# Patient Record
Sex: MALE | Race: BLACK OR AFRICAN AMERICAN | NOT HISPANIC OR LATINO | Employment: UNEMPLOYED | URBAN - METROPOLITAN AREA
[De-identification: names, ages, dates, MRNs, and addresses within clinical notes are randomized per-mention and may not be internally consistent; named-entity substitution may affect disease eponyms.]

---

## 2017-11-27 ENCOUNTER — HISTORICAL (OUTPATIENT)
Dept: INTERNAL MEDICINE | Facility: CLINIC | Age: 57
End: 2017-11-27

## 2017-11-27 LAB
ABS NEUT (OLG): 4.36 X10(3)/MCL (ref 2.1–9.2)
ALBUMIN SERPL-MCNC: 3.7 GM/DL (ref 3.4–5)
ALBUMIN/GLOB SERPL: 1 RATIO (ref 1–2)
ALP SERPL-CCNC: 94 UNIT/L (ref 45–117)
ALT SERPL-CCNC: 31 UNIT/L (ref 12–78)
AST SERPL-CCNC: 11 UNIT/L (ref 15–37)
BASOPHILS # BLD AUTO: 0.05 X10(3)/MCL
BASOPHILS NFR BLD AUTO: 1 % (ref 0–1)
BILIRUB SERPL-MCNC: 0.4 MG/DL (ref 0.2–1)
BILIRUBIN DIRECT+TOT PNL SERPL-MCNC: 0.1 MG/DL
BILIRUBIN DIRECT+TOT PNL SERPL-MCNC: 0.3 MG/DL
BUN SERPL-MCNC: 17 MG/DL (ref 7–18)
CALCIUM SERPL-MCNC: 9 MG/DL (ref 8.5–10.1)
CHLORIDE SERPL-SCNC: 104 MMOL/L (ref 98–107)
CHOLEST SERPL-MCNC: 190 MG/DL
CHOLEST/HDLC SERPL: 3.5 {RATIO} (ref 0–5)
CO2 SERPL-SCNC: 28 MMOL/L (ref 21–32)
CREAT SERPL-MCNC: 0.7 MG/DL (ref 0.6–1.3)
EOSINOPHIL # BLD AUTO: 0.13 X10(3)/MCL
EOSINOPHIL NFR BLD AUTO: 2 % (ref 0–5)
ERYTHROCYTE [DISTWIDTH] IN BLOOD BY AUTOMATED COUNT: 15.3 % (ref 11.5–14.5)
EST. AVERAGE GLUCOSE BLD GHB EST-MCNC: 123 MG/DL
GLOBULIN SER-MCNC: 3.6 GM/ML (ref 2.3–3.5)
GLUCOSE SERPL-MCNC: 90 MG/DL (ref 74–106)
HBA1C MFR BLD: 5.9 % (ref 4.2–6.3)
HCT VFR BLD AUTO: 42.9 % (ref 40–51)
HDLC SERPL-MCNC: 55 MG/DL
HGB BLD-MCNC: 14.5 GM/DL (ref 13.5–17.5)
IMM GRANULOCYTES # BLD AUTO: 0.03 10*3/UL
IMM GRANULOCYTES NFR BLD AUTO: 0 %
LDLC SERPL CALC-MCNC: 101 MG/DL (ref 0–130)
LYMPHOCYTES # BLD AUTO: 2.26 X10(3)/MCL
LYMPHOCYTES NFR BLD AUTO: 30 % (ref 15–40)
MCH RBC QN AUTO: 29.1 PG (ref 26–34)
MCHC RBC AUTO-ENTMCNC: 33.8 GM/DL (ref 31–37)
MCV RBC AUTO: 86 FL (ref 80–100)
MONOCYTES # BLD AUTO: 0.74 X10(3)/MCL
MONOCYTES NFR BLD AUTO: 10 % (ref 4–12)
NEUTROPHILS # BLD AUTO: 4.36 X10(3)/MCL
NEUTROPHILS NFR BLD AUTO: 58 X10(3)/MCL
PLATELET # BLD AUTO: 362 X10(3)/MCL (ref 130–400)
PMV BLD AUTO: 8.2 FL (ref 7.4–10.4)
POTASSIUM SERPL-SCNC: 3.8 MMOL/L (ref 3.5–5.1)
PROT SERPL-MCNC: 7.3 GM/DL (ref 6.4–8.2)
PSA SERPL-MCNC: 0.5 NG/ML
RBC # BLD AUTO: 4.99 X10(6)/MCL (ref 4.5–5.9)
SODIUM SERPL-SCNC: 141 MMOL/L (ref 136–145)
TRIGL SERPL-MCNC: 168 MG/DL
TSH SERPL-ACNC: 1.03 MIU/L (ref 0.36–3.74)
VLDLC SERPL CALC-MCNC: 34 MG/DL
WBC # SPEC AUTO: 7.6 X10(3)/MCL (ref 4.5–11)

## 2018-03-16 ENCOUNTER — HISTORICAL (OUTPATIENT)
Dept: ENDOSCOPY | Facility: HOSPITAL | Age: 58
End: 2018-03-16

## 2019-02-04 ENCOUNTER — HISTORICAL (OUTPATIENT)
Dept: ADMINISTRATIVE | Facility: HOSPITAL | Age: 59
End: 2019-02-04

## 2019-02-04 LAB
ABS NEUT (OLG): 3.99 X10(3)/MCL (ref 2.1–9.2)
ALBUMIN SERPL-MCNC: 3.9 GM/DL (ref 3.4–5)
ALBUMIN/GLOB SERPL: 1.2 RATIO (ref 1.1–2)
ALP SERPL-CCNC: 118 UNIT/L (ref 45–117)
ALT SERPL-CCNC: 36 UNIT/L (ref 12–78)
AST SERPL-CCNC: 14 UNIT/L (ref 15–37)
BASOPHILS # BLD AUTO: 0.03 X10(3)/MCL
BASOPHILS NFR BLD AUTO: 0 %
BILIRUB SERPL-MCNC: 0.2 MG/DL (ref 0.2–1)
BILIRUBIN DIRECT+TOT PNL SERPL-MCNC: <0.1 MG/DL
BILIRUBIN DIRECT+TOT PNL SERPL-MCNC: ABNORMAL MG/DL
BUN SERPL-MCNC: 18 MG/DL (ref 7–18)
CALCIUM SERPL-MCNC: 8.4 MG/DL (ref 8.5–10.1)
CHLORIDE SERPL-SCNC: 106 MMOL/L (ref 98–107)
CHOLEST SERPL-MCNC: 179 MG/DL
CHOLEST/HDLC SERPL: 3.4 {RATIO} (ref 0–5)
CO2 SERPL-SCNC: 28 MMOL/L (ref 21–32)
CREAT SERPL-MCNC: 0.9 MG/DL (ref 0.6–1.3)
EOSINOPHIL # BLD AUTO: 0.12 10*3/UL
EOSINOPHIL NFR BLD AUTO: 2 %
ERYTHROCYTE [DISTWIDTH] IN BLOOD BY AUTOMATED COUNT: 15.3 % (ref 11.5–14.5)
EST. AVERAGE GLUCOSE BLD GHB EST-MCNC: 137 MG/DL
GLOBULIN SER-MCNC: 3.3 GM/ML (ref 2.3–3.5)
GLUCOSE SERPL-MCNC: 104 MG/DL (ref 74–106)
HBA1C MFR BLD: 6.4 % (ref 4.2–6.3)
HCT VFR BLD AUTO: 40.7 % (ref 40–51)
HDLC SERPL-MCNC: 53 MG/DL
HGB BLD-MCNC: 13.5 GM/DL (ref 13.5–17.5)
IMM GRANULOCYTES # BLD AUTO: 0.01 10*3/UL
IMM GRANULOCYTES NFR BLD AUTO: 0 %
LDLC SERPL CALC-MCNC: 84 MG/DL (ref 0–130)
LYMPHOCYTES # BLD AUTO: 2.02 X10(3)/MCL
LYMPHOCYTES NFR BLD AUTO: 30 % (ref 13–40)
MCH RBC QN AUTO: 28.8 PG (ref 26–34)
MCHC RBC AUTO-ENTMCNC: 33.2 GM/DL (ref 31–37)
MCV RBC AUTO: 86.8 FL (ref 80–100)
MONOCYTES # BLD AUTO: 0.63 X10(3)/MCL
MONOCYTES NFR BLD AUTO: 9 % (ref 4–12)
NEUTROPHILS # BLD AUTO: 3.99 X10(3)/MCL
NEUTROPHILS NFR BLD AUTO: 59 X10(3)/MCL
PLATELET # BLD AUTO: 356 X10(3)/MCL (ref 130–400)
PMV BLD AUTO: 8 FL (ref 7.4–10.4)
POTASSIUM SERPL-SCNC: 3.5 MMOL/L (ref 3.5–5.1)
PROT SERPL-MCNC: 7.2 GM/DL (ref 6.4–8.2)
RBC # BLD AUTO: 4.69 X10(6)/MCL (ref 4.5–5.9)
SODIUM SERPL-SCNC: 140 MMOL/L (ref 136–145)
TRIGL SERPL-MCNC: 209 MG/DL
TSH SERPL-ACNC: 1.43 MIU/L (ref 0.36–3.74)
VLDLC SERPL CALC-MCNC: 42 MG/DL
WBC # SPEC AUTO: 6.8 X10(3)/MCL (ref 4.5–11)

## 2020-02-14 ENCOUNTER — HISTORICAL (OUTPATIENT)
Dept: ADMINISTRATIVE | Facility: HOSPITAL | Age: 60
End: 2020-02-14

## 2020-02-14 LAB
ABS NEUT (OLG): 3.26 X10(3)/MCL (ref 2.1–9.2)
ALBUMIN SERPL-MCNC: 3.5 GM/DL (ref 3.4–5)
ALBUMIN/GLOB SERPL: 1.1 RATIO (ref 1.1–2)
ALP SERPL-CCNC: 129 UNIT/L (ref 45–117)
ALT SERPL-CCNC: 31 UNIT/L (ref 12–78)
AST SERPL-CCNC: 12 UNIT/L (ref 15–37)
BASOPHILS # BLD AUTO: 0 X10(3)/MCL (ref 0–0.2)
BASOPHILS NFR BLD AUTO: 1 %
BILIRUB SERPL-MCNC: <0.1 MG/DL (ref 0.2–1)
BILIRUBIN DIRECT+TOT PNL SERPL-MCNC: <0.1 MG/DL (ref 0–0.2)
BILIRUBIN DIRECT+TOT PNL SERPL-MCNC: ABNORMAL MG/DL
BUN SERPL-MCNC: 13 MG/DL (ref 7–18)
CALCIUM SERPL-MCNC: 8.6 MG/DL (ref 8.5–10.1)
CHLORIDE SERPL-SCNC: 110 MMOL/L (ref 98–107)
CHOLEST SERPL-MCNC: 196 MG/DL
CHOLEST/HDLC SERPL: 3.8 {RATIO} (ref 0–5)
CO2 SERPL-SCNC: 27 MMOL/L (ref 21–32)
CREAT SERPL-MCNC: 0.7 MG/DL (ref 0.6–1.3)
EOSINOPHIL # BLD AUTO: 0.2 X10(3)/MCL (ref 0–0.9)
EOSINOPHIL NFR BLD AUTO: 3 %
ERYTHROCYTE [DISTWIDTH] IN BLOOD BY AUTOMATED COUNT: 15 % (ref 11.5–14.5)
ERYTHROCYTE [SEDIMENTATION RATE] IN BLOOD: 2 MM/HR (ref 0–15)
EST. AVERAGE GLUCOSE BLD GHB EST-MCNC: 131 MG/DL
GLOBULIN SER-MCNC: 3.3 GM/ML (ref 2.3–3.5)
GLUCOSE SERPL-MCNC: 97 MG/DL (ref 74–106)
HBA1C MFR BLD: 6.2 % (ref 4.2–6.3)
HCT VFR BLD AUTO: 43.3 % (ref 40–51)
HDLC SERPL-MCNC: 51 MG/DL (ref 40–59)
HGB BLD-MCNC: 14.2 GM/DL (ref 13.5–17.5)
IMM GRANULOCYTES # BLD AUTO: 0.01 10*3/UL
IMM GRANULOCYTES NFR BLD AUTO: 0 %
LDLC SERPL CALC-MCNC: 127 MG/DL
LYMPHOCYTES # BLD AUTO: 2.4 X10(3)/MCL (ref 0.6–4.6)
LYMPHOCYTES NFR BLD AUTO: 37 %
MCH RBC QN AUTO: 29.6 PG (ref 26–34)
MCHC RBC AUTO-ENTMCNC: 32.8 GM/DL (ref 31–37)
MCV RBC AUTO: 90.2 FL (ref 80–100)
MONOCYTES # BLD AUTO: 0.6 X10(3)/MCL (ref 0.1–1.3)
MONOCYTES NFR BLD AUTO: 9 %
NEUTROPHILS # BLD AUTO: 3.26 X10(3)/MCL (ref 2.1–9.2)
NEUTROPHILS NFR BLD AUTO: 50 %
PLATELET # BLD AUTO: 312 X10(3)/MCL (ref 130–400)
PMV BLD AUTO: 8 FL (ref 7.4–10.4)
POTASSIUM SERPL-SCNC: 4 MMOL/L (ref 3.5–5.1)
PROT SERPL-MCNC: 6.8 GM/DL (ref 6.4–8.2)
RBC # BLD AUTO: 4.8 X10(6)/MCL (ref 4.5–5.9)
SODIUM SERPL-SCNC: 140 MMOL/L (ref 136–145)
TRIGL SERPL-MCNC: 90 MG/DL
TSH SERPL-ACNC: 2.63 MIU/L (ref 0.36–3.74)
VLDLC SERPL CALC-MCNC: 18 MG/DL
WBC # SPEC AUTO: 6.5 X10(3)/MCL (ref 4.5–11)

## 2020-06-24 ENCOUNTER — HISTORICAL (OUTPATIENT)
Dept: CARDIOLOGY | Facility: HOSPITAL | Age: 60
End: 2020-06-24

## 2021-02-12 ENCOUNTER — HISTORICAL (OUTPATIENT)
Dept: ADMINISTRATIVE | Facility: HOSPITAL | Age: 61
End: 2021-02-12

## 2021-02-12 LAB
ABS NEUT (OLG): 2.91 X10(3)/MCL (ref 2.1–9.2)
ALBUMIN SERPL-MCNC: 4.1 GM/DL (ref 3.4–4.8)
ALBUMIN/GLOB SERPL: 1.4 RATIO (ref 1.1–2)
ALP SERPL-CCNC: 102 UNIT/L (ref 40–150)
ALT SERPL-CCNC: 33 UNIT/L (ref 0–55)
AST SERPL-CCNC: 16 UNIT/L (ref 5–34)
BASOPHILS # BLD AUTO: 0 X10(3)/MCL (ref 0–0.2)
BASOPHILS NFR BLD AUTO: 1 %
BILIRUB SERPL-MCNC: 0.2 MG/DL
BILIRUBIN DIRECT+TOT PNL SERPL-MCNC: 0.1 MG/DL (ref 0–0.5)
BILIRUBIN DIRECT+TOT PNL SERPL-MCNC: 0.1 MG/DL (ref 0–0.8)
BUN SERPL-MCNC: 14 MG/DL (ref 8.4–25.7)
CALCIUM SERPL-MCNC: 9 MG/DL (ref 8.8–10)
CHLORIDE SERPL-SCNC: 104 MMOL/L (ref 98–107)
CHOLEST SERPL-MCNC: 187 MG/DL
CHOLEST/HDLC SERPL: 4 {RATIO} (ref 0–5)
CO2 SERPL-SCNC: 26 MMOL/L (ref 23–31)
CREAT SERPL-MCNC: 0.75 MG/DL (ref 0.73–1.18)
EOSINOPHIL # BLD AUTO: 0.2 X10(3)/MCL (ref 0–0.9)
EOSINOPHIL NFR BLD AUTO: 3 %
ERYTHROCYTE [DISTWIDTH] IN BLOOD BY AUTOMATED COUNT: 15 % (ref 11.5–14.5)
EST. AVERAGE GLUCOSE BLD GHB EST-MCNC: 119.8 MG/DL
GLOBULIN SER-MCNC: 3 GM/DL (ref 2.4–3.5)
GLUCOSE SERPL-MCNC: 88 MG/DL (ref 82–115)
HBA1C MFR BLD: 5.8 %
HCT VFR BLD AUTO: 43.6 % (ref 40–51)
HDLC SERPL-MCNC: 49 MG/DL (ref 35–60)
HGB BLD-MCNC: 14.2 GM/DL (ref 13.5–17.5)
IMM GRANULOCYTES # BLD AUTO: 0.02 10*3/UL
IMM GRANULOCYTES NFR BLD AUTO: 0 %
LDLC SERPL CALC-MCNC: 110 MG/DL (ref 50–140)
LYMPHOCYTES # BLD AUTO: 2.7 X10(3)/MCL (ref 0.6–4.6)
LYMPHOCYTES NFR BLD AUTO: 41 %
MCH RBC QN AUTO: 28.9 PG (ref 26–34)
MCHC RBC AUTO-ENTMCNC: 32.6 GM/DL (ref 31–37)
MCV RBC AUTO: 88.6 FL (ref 80–100)
MONOCYTES # BLD AUTO: 0.6 X10(3)/MCL (ref 0.1–1.3)
MONOCYTES NFR BLD AUTO: 9 %
NEUTROPHILS # BLD AUTO: 2.91 X10(3)/MCL (ref 2.1–9.2)
NEUTROPHILS NFR BLD AUTO: 45 %
PLATELET # BLD AUTO: 384 X10(3)/MCL (ref 130–400)
PMV BLD AUTO: 8.3 FL (ref 7.4–10.4)
POTASSIUM SERPL-SCNC: 3.6 MMOL/L (ref 3.5–5.1)
PROT SERPL-MCNC: 7.1 GM/DL (ref 5.8–7.6)
PSA SERPL-MCNC: 0.54 NG/ML
RBC # BLD AUTO: 4.92 X10(6)/MCL (ref 4.5–5.9)
SODIUM SERPL-SCNC: 139 MMOL/L (ref 136–145)
T4 FREE SERPL-MCNC: 0.73 NG/DL (ref 0.7–1.48)
TRIGL SERPL-MCNC: 139 MG/DL (ref 34–140)
TSH SERPL-ACNC: 2.56 UIU/ML (ref 0.35–4.94)
VLDLC SERPL CALC-MCNC: 28 MG/DL
WBC # SPEC AUTO: 6.4 X10(3)/MCL (ref 4.5–11)

## 2021-08-25 ENCOUNTER — HISTORICAL (OUTPATIENT)
Dept: ADMINISTRATIVE | Facility: HOSPITAL | Age: 61
End: 2021-08-25

## 2021-08-25 LAB
ABS NEUT (OLG): 3.73 X10(3)/MCL (ref 2.1–9.2)
ALBUMIN SERPL-MCNC: 3.8 GM/DL (ref 3.4–4.8)
ALBUMIN/GLOB SERPL: 1.3 RATIO (ref 1.1–2)
ALP SERPL-CCNC: 124 UNIT/L (ref 40–150)
ALT SERPL-CCNC: 24 UNIT/L (ref 0–55)
AST SERPL-CCNC: 12 UNIT/L (ref 5–34)
BASOPHILS # BLD AUTO: 0 X10(3)/MCL (ref 0–0.2)
BASOPHILS NFR BLD AUTO: 0 %
BILIRUB SERPL-MCNC: 0.2 MG/DL
BILIRUBIN DIRECT+TOT PNL SERPL-MCNC: 0.1 MG/DL (ref 0–0.5)
BILIRUBIN DIRECT+TOT PNL SERPL-MCNC: 0.1 MG/DL (ref 0–0.8)
BUN SERPL-MCNC: 11.9 MG/DL (ref 8.4–25.7)
CALCIUM SERPL-MCNC: 9 MG/DL (ref 8.8–10)
CHLORIDE SERPL-SCNC: 108 MMOL/L (ref 98–107)
CHOLEST SERPL-MCNC: 185 MG/DL
CHOLEST/HDLC SERPL: 3 {RATIO} (ref 0–5)
CO2 SERPL-SCNC: 25 MMOL/L (ref 23–31)
CREAT SERPL-MCNC: 0.84 MG/DL (ref 0.73–1.18)
EOSINOPHIL # BLD AUTO: 0.2 X10(3)/MCL (ref 0–0.9)
EOSINOPHIL NFR BLD AUTO: 2 %
ERYTHROCYTE [DISTWIDTH] IN BLOOD BY AUTOMATED COUNT: 15.1 % (ref 11.5–14.5)
EST. AVERAGE GLUCOSE BLD GHB EST-MCNC: 116.9 MG/DL
GLOBULIN SER-MCNC: 3 GM/DL (ref 2.4–3.5)
GLUCOSE SERPL-MCNC: 110 MG/DL (ref 82–115)
HBA1C MFR BLD: 5.7 %
HCT VFR BLD AUTO: 41.6 % (ref 40–51)
HDLC SERPL-MCNC: 55 MG/DL (ref 35–60)
HGB BLD-MCNC: 13.7 GM/DL (ref 13.5–17.5)
IMM GRANULOCYTES # BLD AUTO: 0.01 10*3/UL
IMM GRANULOCYTES NFR BLD AUTO: 0 %
LDLC SERPL CALC-MCNC: 112 MG/DL (ref 50–140)
LYMPHOCYTES # BLD AUTO: 2.5 X10(3)/MCL (ref 0.6–4.6)
LYMPHOCYTES NFR BLD AUTO: 36 %
MCH RBC QN AUTO: 29 PG (ref 26–34)
MCHC RBC AUTO-ENTMCNC: 32.9 GM/DL (ref 31–37)
MCV RBC AUTO: 87.9 FL (ref 80–100)
MONOCYTES # BLD AUTO: 0.7 X10(3)/MCL (ref 0.1–1.3)
MONOCYTES NFR BLD AUTO: 9 %
NEUTROPHILS # BLD AUTO: 3.73 X10(3)/MCL (ref 2.1–9.2)
NEUTROPHILS NFR BLD AUTO: 52 %
NRBC BLD AUTO-RTO: 0 % (ref 0–0.2)
PHENYTOIN SERPL-MCNC: 17.8 UG/ML (ref 10–20)
PLATELET # BLD AUTO: 356 X10(3)/MCL (ref 130–400)
PMV BLD AUTO: 8 FL (ref 7.4–10.4)
POTASSIUM SERPL-SCNC: 3.7 MMOL/L (ref 3.5–5.1)
PROT SERPL-MCNC: 6.8 GM/DL (ref 5.8–7.6)
RBC # BLD AUTO: 4.73 X10(6)/MCL (ref 4.5–5.9)
SODIUM SERPL-SCNC: 140 MMOL/L (ref 136–145)
T4 FREE SERPL-MCNC: 0.69 NG/DL (ref 0.7–1.48)
TRIGL SERPL-MCNC: 92 MG/DL (ref 34–140)
TSH SERPL-ACNC: 2.69 UIU/ML (ref 0.35–4.94)
VLDLC SERPL CALC-MCNC: 18 MG/DL
WBC # SPEC AUTO: 7.1 X10(3)/MCL (ref 4.5–11)

## 2022-04-11 ENCOUNTER — HISTORICAL (OUTPATIENT)
Dept: ADMINISTRATIVE | Facility: HOSPITAL | Age: 62
End: 2022-04-11
Payer: MEDICAID

## 2022-04-19 ENCOUNTER — HISTORICAL (OUTPATIENT)
Dept: ADMINISTRATIVE | Facility: HOSPITAL | Age: 62
End: 2022-04-19
Payer: MEDICAID

## 2022-04-29 VITALS
BODY MASS INDEX: 29.68 KG/M2 | OXYGEN SATURATION: 98 % | DIASTOLIC BLOOD PRESSURE: 80 MMHG | SYSTOLIC BLOOD PRESSURE: 150 MMHG | WEIGHT: 200.38 LBS | HEIGHT: 69 IN

## 2022-04-30 NOTE — OP NOTE
DATE OF SURGERY:    03/16/2018    SURGEON:  Hao Clemons MD    attending physician:  Hao Clemons MD    PREOPERATIVE DIAGNOSIS:  Personal history of colon polyps.    POSTOPERATIVE DIAGNOSES:  Personal history of colon polyps, colon polyps x2 in the rectum.    PROCEDURE:  Colonoscopy with cold biopsy polypectomy x2 in the rectum.    COMPLICATIONS:  None.    BLOOD LOSS:  Minimal.    FINDINGS:  There were two 3 mm polyps in the mid rectum, both easily removed and retrieved in their entirety with cold biopsy forceps.  They will be sent together in a specimen cup.    RECOMMENDATIONS:  Re-scope in 5 years due to personal history of colon polyps and colon polyps that I found on this colonoscopy.    DETAILS OF THE PROCEDURE:  After the risks and benefits of the procedure were explained to the patient, informed consent was obtained.  He was taken to Endoscopy on 03/16/2018.  He was placed on the endoscopy table in the left lateral decubitus position.  Over the course of procedure, he was administered propofol by Anesthesiology.  I performed a rectal examination, which was negative.  I then inserted the colonoscope all the way to the base of the cecum as evidenced by the appendical orifice and the ileocecal valve.  I centered the scope and slowly began to retract it, looking around in a 360-degree fashion.  There were no mucosal abnormalities, no masses, no AVMs, no evidence of inflammatory bowel disease, no diverticula.  In the rectum, he has two small 3 mm polyps near the mid to distal rectum.  They were both     removed easily and retrieved in their entirety with cold biopsy forceps.  The scope was retracted.  He tolerated the procedure well, was taken to recovery room in stable condition.        ______________________________  MD HETAL Correa/KENNETH  DD:  03/16/2018  Time:  09:25AM  DT:  03/16/2018  Time:  09:37AM  Job #:  [id number]

## 2022-08-11 ENCOUNTER — OFFICE VISIT (OUTPATIENT)
Dept: INTERNAL MEDICINE | Facility: CLINIC | Age: 62
End: 2022-08-11
Payer: MEDICAID

## 2022-08-11 VITALS
HEIGHT: 69 IN | DIASTOLIC BLOOD PRESSURE: 86 MMHG | WEIGHT: 219.13 LBS | RESPIRATION RATE: 18 BRPM | TEMPERATURE: 98 F | BODY MASS INDEX: 32.46 KG/M2 | SYSTOLIC BLOOD PRESSURE: 133 MMHG | HEART RATE: 90 BPM

## 2022-08-11 DIAGNOSIS — Z12.5 PROSTATE CANCER SCREENING: ICD-10-CM

## 2022-08-11 DIAGNOSIS — Z87.891 HISTORY OF TOBACCO USE: ICD-10-CM

## 2022-08-11 DIAGNOSIS — Z12.2 SCREENING FOR LUNG CANCER: ICD-10-CM

## 2022-08-11 DIAGNOSIS — N40.0 BENIGN PROSTATIC HYPERPLASIA, UNSPECIFIED WHETHER LOWER URINARY TRACT SYMPTOMS PRESENT: ICD-10-CM

## 2022-08-11 DIAGNOSIS — Z00.00 ROUTINE ADULT HEALTH MAINTENANCE: ICD-10-CM

## 2022-08-11 DIAGNOSIS — Z13.21 ENCOUNTER FOR VITAMIN DEFICIENCY SCREENING: ICD-10-CM

## 2022-08-11 DIAGNOSIS — G40.909 SEIZURE DISORDER: ICD-10-CM

## 2022-08-11 DIAGNOSIS — I10 HYPERTENSION, UNSPECIFIED TYPE: ICD-10-CM

## 2022-08-11 DIAGNOSIS — Z12.11 COLON CANCER SCREENING: Primary | ICD-10-CM

## 2022-08-11 PROCEDURE — 99214 OFFICE O/P EST MOD 30 MIN: CPT | Mod: PBBFAC

## 2022-08-11 RX ORDER — FINASTERIDE 5 MG/1
5 TABLET, FILM COATED ORAL DAILY
COMMUNITY
Start: 2022-08-07 | End: 2022-08-11 | Stop reason: SDUPTHER

## 2022-08-11 RX ORDER — PHENYTOIN SODIUM 100 MG/1
100 CAPSULE, EXTENDED RELEASE ORAL 2 TIMES DAILY
COMMUNITY
End: 2022-08-11 | Stop reason: SDUPTHER

## 2022-08-11 RX ORDER — PHENYTOIN SODIUM 100 MG/1
CAPSULE, EXTENDED RELEASE ORAL
Qty: 150 CAPSULE | Refills: 6 | Status: SHIPPED | OUTPATIENT
Start: 2022-08-11 | End: 2023-02-22 | Stop reason: SDUPTHER

## 2022-08-11 RX ORDER — FINASTERIDE 5 MG/1
5 TABLET, FILM COATED ORAL DAILY
Qty: 90 TABLET | Refills: 3 | Status: SHIPPED | OUTPATIENT
Start: 2022-08-11 | End: 2023-02-22 | Stop reason: SDUPTHER

## 2022-08-11 RX ORDER — LOSARTAN POTASSIUM 100 MG/1
100 TABLET ORAL DAILY
Qty: 90 TABLET | Refills: 3 | Status: SHIPPED | OUTPATIENT
Start: 2022-08-11 | End: 2023-02-22 | Stop reason: SDUPTHER

## 2022-08-11 RX ORDER — LOSARTAN POTASSIUM 100 MG/1
100 TABLET ORAL ONCE
COMMUNITY
End: 2022-08-11 | Stop reason: SDUPTHER

## 2022-08-11 NOTE — PROGRESS NOTES
"  [facility] INTERNAL MEDICINE  OUTPATIENT OFFICE VISIT NOTE    SUBJECTIVE:      HPI: Severiano Vences Jr. is a 61 y.o. yo male w/ PMH of ***, who presents today for ***.       ROS:  (+)  (-) Chest pain, palpitations, SOB, fever, night sweats, chills, diarrhea, constipation.       OBJECTIVE:     Vital signs:   /86 (BP Location: Left arm, Patient Position: Sitting, BP Method: Medium (Automatic))   Pulse 90   Temp 98.2 °F (36.8 °C) (Oral)   Resp 18   Ht 5' 9" (1.753 m)   Wt 99.4 kg (219 lb 2.2 oz)   BMI 32.36 kg/m²      Physical Examination:  General: Well nourished w/o distress  Neck: Full ROM; no lymphadenopathy  Pulm: CTA bilaterally, normal work of breathing  CV: S1, S2 w/o murmurs or gallops; no edema noted  GI: Soft, non-tender to palpation, with normal bowel sounds in all quadrants, no masses on palpation  MSK: Full ROM of all extremities and spine w/o limitation or discomfort  Derm: No rashes, abnormal bruising, or skin lesions  Neuro: AAOx4;motor/sensory function intact, no gross motor deficits noted on exam   Psych: Cooperative; appropriate mood and affect, answers all questions appropriately       ASSESSMENT & PLAN:     ***  -    ***  -    ***  -    ***  -    ***  -      ***  -      Health Maintenance:  -    Labs today  Return to clinic in *** months.    Severiano Vo, DO  [facility] Internal Medicine PGY3      "

## 2022-08-11 NOTE — PROGRESS NOTES
"  [facility] INTERNAL MEDICINE  OUTPATIENT OFFICE VISIT NOTE    SUBJECTIVE:      HPI: Mr. Vences is a 61 yom with PMHx significant for hypertension, BPH, and seizure disorder who presents to [facility] IM [facility] clinic for routine follow up, last seen in clinic in February 2022.  Mr. Vences has a PMHx that is significant for hypertension that is well controlled, and a seizure disorder with his last known seizure approximately 3 years ago. He states that he has been on phenytoin for greater than 10 years, his last seizure approximately 3 years ago was related to medication noncompliance at that time.    Today he has no acute complaints, states that he still does have vague right-sided flank pain when he is constipated, pain resolves with bowel movement.     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  Initially stated that he had quit smoking, though today he reports intermittent smoking and marijuana use for the past several years.       ROS:  (+)  (-) Chest pain, palpitations, SOB, fever, night sweats, chills, diarrhea, constipation.       OBJECTIVE:     Vital signs:   /86 (BP Location: Left arm, Patient Position: Sitting, BP Method: Medium (Automatic))   Pulse 90   Temp 98.2 °F (36.8 °C) (Oral)   Resp 18   Ht 5' 9" (1.753 m)   Wt 99.4 kg (219 lb 2.2 oz)   BMI 32.36 kg/m²      Physical Examination:  General: Well nourished w/o distress  Neck: Full ROM; no lymphadenopathy  Pulm: CTA bilaterally, normal work of breathing  CV: S1, S2 w/o murmurs or gallops; no edema noted  GI: Soft, non-tender to palpation, with normal bowel sounds in all quadrants, no masses on palpation  MSK: Full ROM of all extremities and spine w/o limitation or discomfort  Derm: No rashes, abnormal " bruising, or skin lesions  Neuro: AAOx4;motor/sensory function intact, no gross motor deficits noted on exam   Psych: Cooperative; appropriate mood and affect, answers all questions appropriately       ASSESSMENT & PLAN:   Hypertension  -/86 today  -Continue losartan 100 mg daily    Seizure disorder  -Last seizure approximately 3 years ago, patient correlates this with medication noncompliance at the time.  -Continue current regimen of phenytoin (200 mg every morning, 300 mg every afternoon, states he has been on this regiment for approximately 10 years)    BPH  -States he was previously diagnosed with BPH in the past, takes finasteride 5 mg daily, denies dysuria, denies frequency or dribbling  -PSA today    Health maintenance  -Most recent labs from 12/14/2021 including CBC, CMP reviewed, A1c 5.7, lipid panel WNL, TSH 2.68 on 8/25/2021  -colonoscopy 2018, repeat in 5 years (2023), repeat colonoscopy referral placed today with [facility] GI lab  -declined flu vaccine  -declined Shingles vaccination   -takes daily aspirin  -Hep panel, syphilis screening ordered today  -low dose lung CA screen ordered today given tobacco use history and ongoing smoking      Repeat yearly labs including A1c, TSH, CBC, CMP, Vit D, PSA today  Colonoscopy referral placed today  Low Dose CT lung CA screen ordered today    RTC in 3 months       Severiano Vo,   [employer] Internal Medicine PGY3      Update:  Called and spoke with patient about lab results from this afternoon, all labs essentially WNL.  Lipid panel abnormal, though patient states he had breakfast and lunch, will repeat fasting next time.  All questions answered, patient expressed understanding.

## 2022-09-09 DIAGNOSIS — Z12.11 SCREEN FOR COLON CANCER: Primary | ICD-10-CM

## 2022-09-09 RX ORDER — POLYETHYLENE GLYCOL 3350, SODIUM SULFATE, SODIUM CHLORIDE, POTASSIUM CHLORIDE, SODIUM ASCORBATE, AND ASCORBIC ACID 7.5-2.691G
2000 KIT ORAL ONCE
Qty: 1 KIT | Refills: 0 | Status: SHIPPED | OUTPATIENT
Start: 2022-09-09 | End: 2022-09-09

## 2022-11-02 ENCOUNTER — OFFICE VISIT (OUTPATIENT)
Dept: INTERNAL MEDICINE | Facility: CLINIC | Age: 62
End: 2022-11-02
Payer: MEDICAID

## 2022-11-02 VITALS
HEIGHT: 69 IN | RESPIRATION RATE: 16 BRPM | BODY MASS INDEX: 28.93 KG/M2 | DIASTOLIC BLOOD PRESSURE: 80 MMHG | WEIGHT: 195.31 LBS | TEMPERATURE: 98 F | HEART RATE: 60 BPM | SYSTOLIC BLOOD PRESSURE: 140 MMHG

## 2022-11-02 DIAGNOSIS — Z00.00 ROUTINE ADULT HEALTH MAINTENANCE: ICD-10-CM

## 2022-11-02 DIAGNOSIS — I10 PRIMARY HYPERTENSION: Primary | ICD-10-CM

## 2022-11-02 DIAGNOSIS — N40.0 BENIGN PROSTATIC HYPERPLASIA WITHOUT LOWER URINARY TRACT SYMPTOMS: ICD-10-CM

## 2022-11-02 DIAGNOSIS — R56.9 SEIZURE: ICD-10-CM

## 2022-11-02 PROCEDURE — 99213 OFFICE O/P EST LOW 20 MIN: CPT | Mod: PBBFAC

## 2022-11-02 NOTE — PROGRESS NOTES
"  [facility] INTERNAL MEDICINE  OUTPATIENT OFFICE VISIT NOTE    SUBJECTIVE:   Chief Complaint: Routine follow up     HPI: Mr. Vences is a 61 yom with PMHx significant for hypertension, BPH, and seizure disorder who presents to [facility] IM [facility]ME clinic for routine follow up, last seen in clinic on 8/11/22.  Mr. Vences has a PMHx that is significant for hypertension that is well controlled, and a seizure disorder with his last known seizure approximately 3 years ago. He states that he has been on phenytoin for greater than 10-11 years, his last seizure approximately 3 years ago was related to medication noncompliance at that time.    Today has no acute complaints. Reports strict medication compliance and no seizures.      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  Initially stated that he had quit smoking, though at previous visit reported intermittent marijuana use for the past several years.     ROS:  (+)  (-) Chest pain, palpitations, SOB, fever, night sweats, chills, diarrhea, constipation.       OBJECTIVE:     Vital signs:   BP (!) 140/80 Comment: manual  Pulse 60   Temp 97.7 °F (36.5 °C) (Oral)   Resp 16   Ht 5' 9" (1.753 m)   Wt 88.6 kg (195 lb 5.2 oz)   BMI 28.84 kg/m²      Physical Examination:  General: Well nourished w/o distress  Pulm: CTA bilaterally, normal work of breathing  CV: S1, S2 w/o murmurs or gallops; no edema noted  GI: Soft, non-tender to palpation, with normal bowel sounds in all quadrants, no masses on palpation  MSK: Full ROM of all extremities and spine w/o limitation or discomfort  Derm: No rashes, abnormal bruising, or skin lesions  Neuro: AAOx4;motor/sensory function intact, no gross motor deficits noted on exam   Psych: Cooperative; " appropriate mood and affect, answers all questions appropriately       ASSESSMENT & PLAN:   Hypertension  -/80 today  -Continue losartan 100 mg daily    Seizure disorder  -Last seizure approximately 3 years ago, patient correlates this with medication noncompliance at the time.  -Continue current regimen of phenytoin (200 mg every morning, 300 mg every afternoon, states he has been on this regiment for approximately 10 years)    BPH  -States he was previously diagnosed with BPH in the past, takes finasteride 5 mg daily, denies dysuria, denies frequency or dribbling  -PSA .36 on 8/11/22    Health maintenance  -Most recent labs from 8/11/22 reviewed, essentially WNL  -colonoscopy 2018, repeat in 5 years (2023), repeat colonoscopy pending 12/1/22  -declined flu vaccine  -declined Shingles vaccination   -takes daily aspirin  -Hep panel, syphilis screening negative on 8/11/22  -low dose lung CA screen negative on 8/23/22, recommendations to continue annual screening         Severiano Vo DO  [facility] Internal Medicine PGY3

## 2022-11-07 NOTE — PROGRESS NOTES
Attending Addendum:   Patient seen and examined in clinic. Management and Plan were discussed with resident. Care was reasonable and necessary.   Cecilia Thayer MD  [facility] - Internal Medicine

## 2022-12-01 ENCOUNTER — ANESTHESIA EVENT (OUTPATIENT)
Dept: ENDOSCOPY | Facility: HOSPITAL | Age: 62
End: 2022-12-01
Payer: MEDICAID

## 2022-12-01 ENCOUNTER — HOSPITAL ENCOUNTER (OUTPATIENT)
Facility: HOSPITAL | Age: 62
Discharge: HOME | End: 2022-12-01
Attending: INTERNAL MEDICINE | Admitting: INTERNAL MEDICINE
Payer: MEDICAID

## 2022-12-01 ENCOUNTER — ANESTHESIA (OUTPATIENT)
Dept: ENDOSCOPY | Facility: HOSPITAL | Age: 62
End: 2022-12-01
Payer: MEDICAID

## 2022-12-01 DIAGNOSIS — Z86.010 HX OF COLONIC POLYPS: ICD-10-CM

## 2022-12-01 DIAGNOSIS — D12.5 ADENOMATOUS POLYP OF SIGMOID COLON: Primary | ICD-10-CM

## 2022-12-01 DIAGNOSIS — D12.4 ADENOMATOUS POLYP OF DESCENDING COLON: ICD-10-CM

## 2022-12-01 PROCEDURE — 88305 TISSUE EXAM BY PATHOLOGIST: CPT | Performed by: INTERNAL MEDICINE

## 2022-12-01 PROCEDURE — 45385 COLONOSCOPY W/LESION REMOVAL: CPT | Mod: ,,, | Performed by: INTERNAL MEDICINE

## 2022-12-01 PROCEDURE — 27201423 OPTIME MED/SURG SUP & DEVICES STERILE SUPPLY: Performed by: INTERNAL MEDICINE

## 2022-12-01 PROCEDURE — 00811 ANES LWR INTST NDSC NOS: CPT | Performed by: INTERNAL MEDICINE

## 2022-12-01 PROCEDURE — 37000008 HC ANESTHESIA 1ST 15 MINUTES: Performed by: INTERNAL MEDICINE

## 2022-12-01 PROCEDURE — 45385 PR COLONOSCOPY,REMV LESN,SNARE: ICD-10-PCS | Mod: ,,, | Performed by: INTERNAL MEDICINE

## 2022-12-01 PROCEDURE — 37000009 HC ANESTHESIA EA ADD 15 MINS: Performed by: INTERNAL MEDICINE

## 2022-12-01 PROCEDURE — 63600175 PHARM REV CODE 636 W HCPCS: Performed by: SPECIALIST

## 2022-12-01 PROCEDURE — 45385 COLONOSCOPY W/LESION REMOVAL: CPT | Performed by: INTERNAL MEDICINE

## 2022-12-01 PROCEDURE — 63600175 PHARM REV CODE 636 W HCPCS: Performed by: NURSE ANESTHETIST, CERTIFIED REGISTERED

## 2022-12-01 PROCEDURE — 25000003 PHARM REV CODE 250: Performed by: NURSE ANESTHETIST, CERTIFIED REGISTERED

## 2022-12-01 RX ORDER — SODIUM CHLORIDE, SODIUM LACTATE, POTASSIUM CHLORIDE, CALCIUM CHLORIDE 600; 310; 30; 20 MG/100ML; MG/100ML; MG/100ML; MG/100ML
INJECTION, SOLUTION INTRAVENOUS CONTINUOUS
Status: DISCONTINUED | OUTPATIENT
Start: 2022-12-01 | End: 2022-12-05 | Stop reason: HOSPADM

## 2022-12-01 RX ORDER — PROPOFOL 10 MG/ML
VIAL (ML) INTRAVENOUS
Status: DISCONTINUED | OUTPATIENT
Start: 2022-12-01 | End: 2022-12-01

## 2022-12-01 RX ORDER — LIDOCAINE HYDROCHLORIDE 20 MG/ML
INJECTION, SOLUTION EPIDURAL; INFILTRATION; INTRACAUDAL; PERINEURAL
Status: DISCONTINUED | OUTPATIENT
Start: 2022-12-01 | End: 2022-12-01

## 2022-12-01 RX ADMIN — PROPOFOL 80 MG: 10 INJECTION, EMULSION INTRAVENOUS at 01:12

## 2022-12-01 RX ADMIN — PROPOFOL 30 MG: 10 INJECTION, EMULSION INTRAVENOUS at 02:12

## 2022-12-01 RX ADMIN — PROPOFOL 75 MG: 10 INJECTION, EMULSION INTRAVENOUS at 02:12

## 2022-12-01 RX ADMIN — PROPOFOL 30 MG: 10 INJECTION, EMULSION INTRAVENOUS at 01:12

## 2022-12-01 RX ADMIN — PROPOFOL 25 MG: 10 INJECTION, EMULSION INTRAVENOUS at 02:12

## 2022-12-01 RX ADMIN — PROPOFOL 50 MG: 10 INJECTION, EMULSION INTRAVENOUS at 02:12

## 2022-12-01 RX ADMIN — SODIUM CHLORIDE, POTASSIUM CHLORIDE, SODIUM LACTATE AND CALCIUM CHLORIDE: 600; 310; 30; 20 INJECTION, SOLUTION INTRAVENOUS at 12:12

## 2022-12-01 RX ADMIN — LIDOCAINE HYDROCHLORIDE 50 MG: 20 INJECTION, SOLUTION EPIDURAL; INFILTRATION; INTRACAUDAL; PERINEURAL at 01:12

## 2022-12-01 RX ADMIN — PROPOFOL 40 MG: 10 INJECTION, EMULSION INTRAVENOUS at 02:12

## 2022-12-01 NOTE — PLAN OF CARE
Patient awake. Abdominal cramping decreased since passing more flatus. Assisted up to restroom to sit on toilet and dress.

## 2022-12-01 NOTE — [employer]OVATION PATIENT INSTRUCTIONS
Discharge Summary/Instructions after an Endoscopic Procedure  Patient Name: Severiano Vences  Patient MRN: [id number]  Patient YOB: 1960  Thursday, December 1, 2022  Nii Rudolph MD  Dear patient,  As a result of recent federal legislation (The Federal Cures Act), you may   receive lab or pathology results from your procedure in your [facility]   account before your physician is able to contact you. Your physician or   their representative will relay the results to you with their   recommendations at their soonest availability.  Thank you,  RESTRICTIONS:  During your procedure today, you received medications for sedation.  These   medications may affect your judgment, balance and coordination.  Therefore,   for 24 hours, you have the following restrictions:   - DO NOT drive a car, operate machinery, make legal/financial decisions,   sign important papers or drink alcohol.    ACTIVITY:  Today: no heavy lifting, straining or running due to procedural   sedation/anesthesia.  The following day: return to full activity including work.  DIET:  Eat and drink normally unless instructed otherwise.     TREATMENT FOR COMMON SIDE EFFECTS:  - Mild abdominal pain, nausea, belching, bloating or excessive gas:  rest,   eat lightly and use a heating pad.  - Sore Throat: treat with throat lozenges and/or gargle with warm salt   water.  - Because air was used during the procedure, expelling large amounts of air   from your rectum or belching is normal.  - If a bowel prep was taken, you may not have a bowel movement for 1-3 days.    This is normal.  SYMPTOMS TO WATCH FOR AND REPORT TO YOUR PHYSICIAN:  1. Abdominal pain or bloating, other than gas cramps.  2. Chest pain.  3. Back pain.  4. Signs of infection such as: chills or fever occurring within 24 hours   after the procedure.  5. Rectal bleeding, which would show as bright red, maroon, or black stools.   (A tablespoon of blood from the rectum is not serious, especially  if   hemorrhoids are present.)  6. Vomiting.  7. Weakness or dizziness.  GO DIRECTLY TO THE NEAREST EMERGENCY ROOM IF YOU HAVE ANY OF THE FOLLOWING:      Difficulty breathing              Chills and/or fever over 101 F   Persistent vomiting and/or vomiting blood   Severe abdominal pain   Severe chest pain   Black, tarry stools   Bleeding- more than one tablespoon   Any other symptom or condition that you feel may need urgent attention  Your doctor recommends these additional instructions:  If any biopsies were taken, your doctors clinic will contact you in 1 to 2   weeks with any results.  For questions, problems or results please call your physician - Nii Rudolph MD at Work:  [phone].  [facility] , EMERGENCY ROOM PHONE NUMBER: [phone]  IF A COMPLICATION OR EMERGENCY SITUATION ARISES AND YOU ARE UNABLE TO REACH   YOUR PHYSICIAN - GO DIRECTLY TO THE EMERGENCY ROOM.  MD Nii Guillen MD  12/1/2022 3:12:23 PM  This report has been verified and signed electronically.  Dear patient,  As a result of recent federal legislation (The Federal Cures Act), you may   receive lab or pathology results from your procedure in your [facility]   account before your physician is able to contact you. Your physician or   their representative will relay the results to you with their   recommendations at their soonest availability.  Thank you,  PROVATION

## 2022-12-01 NOTE — DISCHARGE SUMMARY
[facility] - Endoscopy  Discharge Note  Short Stay    Procedure(s) (LRB):  COLONOSCOPY, WITH POLYPECTOMY USING SNARE (N/A)  COLONOSCOPY, WITH POLYPECTOMY USING  BIOPSY FORCEPS  The procedure of colonoscopy was explained to the patient and consent obtained.  The patient was transferred to the endoscopy suite, general IV anesthesia was provided by anesthesia Services.  Rectal exam was performed and normal.  The Olympus video colonoscope was introduced per rectum and advanced around the colon to the cecum.  The ileocecal valve and appendiceal orifice were identified and normal.  The cecal mucosa was normal ascending and transverse colon was carefully examined and noted to be normal.  In the descending colon a 4 mm sessile polyp was identified and removed with cold snare.  The remainder of the descending colon was normal.  In the sigmoid colon a 3 mm sessile polyp was identified and removed with cold snare and just distal to this a diminutive polyp was removed with cold biopsy forceps.  The rectum was normal including retroflexed view.  The endoscope was withdrawn.  Withdrawal time cecum to rectum 26 minutes.  A follow-up colonoscopy in 5 years is recommended.    OUTCOME: Patient tolerated treatment/procedure well without complication and is now ready for discharge.    DISPOSITION: Home or Self Care    FINAL DIAGNOSIS:  <principal problem not specified>    FOLLOWUP: In clinic    DISCHARGE INSTRUCTIONS:    Discharge Procedure Orders   Diet general     Call MD for:  temperature >100.4     Call MD for:  persistent nausea and vomiting     Call MD for:  severe uncontrolled pain     Call MD for:  difficulty breathing, headache or visual disturbances     Activity as tolerated         Clinical Reference Documents Added to Patient Instructions         Document    COLONOSCOPY DISCHARGE INSTRUCTIONS (ENGLISH)            TIME SPENT ON DISCHARGE: 10 minutes

## 2022-12-01 NOTE — ANESTHESIA POSTPROCEDURE EVALUATION
Anesthesia Post Evaluation    Patient: Severiano Vences Jr.    Procedure(s) Performed: Procedure(s) (LRB):  COLONOSCOPY, WITH POLYPECTOMY USING SNARE (N/A)  COLONOSCOPY, WITH POLYPECTOMY USING HOT BIOPSY FORCEPS    Final Anesthesia Type: general      Patient location during evaluation: GI PACU  Patient participation: Yes- Able to Participate  Level of consciousness: awake and alert  Post-procedure vital signs: reviewed and stable  Pain management: adequate  Airway patency: patent    PONV status at discharge: No PONV  Anesthetic complications: no      Cardiovascular status: stable  Respiratory status: unassisted, spontaneous ventilation and room air  Follow-up not needed.          Vitals Value Taken Time   /94    Temp 36 12/01/22 1500   79  12/01/22 1500   Resp 22 12/01/22 1500   SpO2 98 12/01/22 1500         No case tracking events are documented in the log.      Pain/Princess Score: Princess Score: 9 (12/1/2022  2:59 PM)

## 2022-12-01 NOTE — TRANSFER OF CARE
"Anesthesia Transfer of Care Note    Patient: Severiano Vences Jr.    Procedure(s) Performed: Procedure(s) (LRB):  COLONOSCOPY, WITH POLYPECTOMY USING SNARE (N/A)  COLONOSCOPY, WITH POLYPECTOMY USING HOT BIOPSY FORCEPS    Patient location: GI    Anesthesia Type: general    Post pain: adequate analgesia    Post assessment: no apparent anesthetic complications    Post vital signs: stable    Level of consciousness: responds to stimulation    Nausea/Vomiting: no nausea/vomiting    Complications: none    Transfer of care protocol was followedComments: Report to Laila KOTHARI      Last vitals:   Visit Vitals  BP (!) 161/98   Pulse 80   Temp 36.9 °C (98.4 °F) (Oral)   Resp 16   Ht 5' 9" (1.753 m)   Wt 88.4 kg (194 lb 14.2 oz)   SpO2 100%   BMI 28.78 kg/m²     "

## 2022-12-01 NOTE — PLAN OF CARE
Patient dressed. Ride states will be one hour until arrival. Will get patient a sandwich tray during wait.

## 2022-12-01 NOTE — H&P
Colonoscopy History and Physical    Patient Name: Severiano Vences Jr.  MRN: [id number]  : 1960  Date of Procedure:  2022  Referring Physician: Severiano Vo DO  Primary Physician: Severiano Vo DO  Procedure Physician: Nii Rudolph    Procedure - Colonoscopy  ASA - per anesthesia  Mallampati - per anesthesia  History of Anesthesia problems - no  Family history Anesthesia problems -  no   Plan of anesthesia - General    Diagnosis:  Screening colonoscopy  Chief Complaint: Same as above    HPI: Patient is an 62 y.o. male is here for the above. Patient has a history of colonic polyps on previous colonoscopy. All of which have been benign. Patient had a colonoscopy approximately 5 years ago. He denies any bloody stools, abdominal pain, or recent weight loss.     Last colonoscopy: 5 years ago  Family history:   Family History   Problem Relation Age of Onset    Colon cancer Father       Anticoagulation: Denies    ROS:  Constitutional: No fevers, chills, No weight loss  CV: No chest pain  Pulm: No cough, No shortness of breath  GI: see HPI    Medical History:   Past Medical History:   Diagnosis Date    BPH (benign prostatic hyperplasia)     Hypertension     Seizures          Surgical History:   History reviewed. No pertinent surgical history.    Family History:   Family History   Problem Relation Age of Onset    Colon cancer Father    .    Social History:   Social History     Socioeconomic History    Marital status:    Occupational History    Occupation:    Tobacco Use    Smoking status: Former    Smokeless tobacco: Never    Tobacco comments:     Quit 27 years ago   Substance and Sexual Activity    Alcohol use: Yes     Alcohol/week: 1.0 standard drink     Types: 1 Glasses of wine per week     Comment: OCCASIONAL WINE DRINKER    Drug use: Yes     Frequency: 7.0 times per week     Types: Marijuana    Sexual activity: Not Currently       Review of patient's allergies indicates:  No Known  "Allergies    Medications:   Medications Prior to Admission   Medication Sig Dispense Refill Last Dose    finasteride (PROSCAR) 5 mg tablet Take 1 tablet (5 mg total) by mouth once daily. 90 tablet 3 11/30/2022    losartan (COZAAR) 100 MG tablet Take 1 tablet (100 mg total) by mouth once daily. 90 tablet 3 11/30/2022    phenytoin (DILANTIN) 100 MG ER capsule 2 CAPS IN AM, 3 CAPS AT BEDTIME 150 capsule 6 12/1/2022         Physical Exam:    Vital Signs:   Vitals:    12/01/22 1153   BP: (!) 161/98   Pulse: 80   Resp: 16   Temp: 98.4 °F (36.9 °C)     BP (!) 161/98   Pulse 80   Temp 98.4 °F (36.9 °C) (Oral)   Resp 16   Ht 5' 9" (1.753 m)   Wt 88.4 kg (194 lb 14.2 oz)   SpO2 100%   BMI 28.78 kg/m²     Physical Exam  Constitutional:       Appearance: Normal appearance. He is obese.   HENT:      Head: Normocephalic.      Nose: Nose normal.      Mouth/Throat:      Pharynx: Oropharynx is clear.   Eyes:      Extraocular Movements: Extraocular movements intact.   Cardiovascular:      Rate and Rhythm: Normal rate.   Pulmonary:      Effort: Pulmonary effort is normal.   Abdominal:      Palpations: Abdomen is soft.   Musculoskeletal:      Cervical back: Normal range of motion.   Skin:     General: Skin is warm and dry.   Neurological:      Mental Status: He is alert and oriented to person, place, and time.   Psychiatric:         Behavior: Behavior normal.        Labs:  Lab Results   Component Value Date    WBC 6.9 08/11/2022    HGB 14.0 08/11/2022    HCT 41.9 (L) 08/11/2022    MCV 87.5 08/11/2022     08/11/2022     No results found for: INR, PT, PTT  Lab Results   Component Value Date     08/11/2022    K 3.8 08/11/2022    CO2 26 08/11/2022    BUN 15.8 08/11/2022    CREATININE 0.86 08/11/2022    LABPROT 6.9 08/11/2022    ALBUMIN 4.0 08/11/2022    BILITOT 0.1 08/11/2022    ALKPHOS 88 08/11/2022    ALT 30 08/11/2022    AST 17 08/11/2022       Assessment and Plan:     History reviewed, vital signs satisfactory, " cardiopulmonary status satisfactory.  I have explained the sedation options, risks, benefits, and alternatives of this endoscopic procedure to the patient including but not limited to bleeding, inflammation, infection, perforation, and death.  All questions were answered and the patient consented to proceed with procedure as planned.   The patient is deemed an appropriate candidate for the sedation as planned.      Fernanda Walker MD  [employer] General Surgery, PGY2      12/1/2022  1:32 PM

## 2022-12-01 NOTE — PLAN OF CARE
Patient awake in bed. Sipping sprite drink. Tolerating well. Complains of cramping in his abdomen. Patient passed some flatus while in room and encouraged to continue. Dr. Rudolph in room speaking to patient giving results.

## 2022-12-01 NOTE — ANESTHESIA PREPROCEDURE EVALUATION
12/01/2022  Severiano Vences Jr. is a 62 y.o., male for CLN screening     History of HTN and Seizures (last greater than 10 years ago)     History reviewed. No pertinent surgical history.    Lab Results   Component Value Date    WBC 6.9 08/11/2022    HGB 14.0 08/11/2022    HCT 41.9 (L) 08/11/2022     08/11/2022    CHOL 199 08/11/2022    TRIG 282 (H) 08/11/2022    HDL 40 08/11/2022    ALT 30 08/11/2022    AST 17 08/11/2022     08/11/2022    K 3.8 08/11/2022    CREATININE 0.86 08/11/2022    BUN 15.8 08/11/2022    CO2 26 08/11/2022    TSH 1.5665 08/11/2022    PSA 0.36 08/11/2022    HGBA1C 5.7 08/25/2021       Active Ambulatory Problems     Diagnosis Date Noted    BPH (benign prostatic hyperplasia) 08/11/2022    History of tobacco use 08/11/2022     Resolved Ambulatory Problems     Diagnosis Date Noted    No Resolved Ambulatory Problems     Past Medical History:   Diagnosis Date    Hypertension     Seizures          Pre-op Assessment    I have reviewed the Patient Summary Reports.     I have reviewed the Nursing Notes. I have reviewed the NPO Status.   I have reviewed the Medications.     Review of Systems  Anesthesia Hx:  No problems with previous Anesthesia  History of prior surgery of interest to airway management or planning: Denies Family Hx of Anesthesia complications.   Denies Personal Hx of Anesthesia complications.   Hematology/Oncology:  Hematology Normal   Oncology Normal     EENT/Dental:EENT/Dental Normal   Cardiovascular:  Cardiovascular Normal     Pulmonary:  Pulmonary Normal    Renal/:  Renal/ Normal     Hepatic/GI:  Hepatic/GI Normal    Musculoskeletal:  Musculoskeletal Normal    Neurological:  Neurology Normal    Endocrine:  Endocrine Normal    Dermatological:  Skin Normal    Psych:  Psychiatric Normal           Physical Exam  General: Well nourished, Cooperative, Alert and  Oriented    Airway:  Mallampati: I / I  Mouth Opening: Normal  TM Distance: Normal  Tongue: Large, Normal  Neck ROM: Normal ROM    Dental:  Intact        Anesthesia Plan  Type of Anesthesia, risks & benefits discussed:    Anesthesia Type: Gen Natural Airway  Intra-op Monitoring Plan: Standard ASA Monitors  Post Op Pain Control Plan: IV/PO Opioids PRN  (medical reason for not using multimodal pain management)  Induction:  IV  Informed Consent: Informed consent signed with the Patient and all parties understand the risks and agree with anesthesia plan.  All questions answered. Patient consented to blood products? No  ASA Score: 3  Day of Surgery Review of History & Physical: H&P Update referred to the surgeon/provider.    Ready For Surgery From Anesthesia Perspective.     .

## 2022-12-05 VITALS
BODY MASS INDEX: 28.87 KG/M2 | WEIGHT: 194.88 LBS | DIASTOLIC BLOOD PRESSURE: 95 MMHG | SYSTOLIC BLOOD PRESSURE: 169 MMHG | OXYGEN SATURATION: 96 % | HEART RATE: 79 BPM | TEMPERATURE: 98 F | RESPIRATION RATE: 18 BRPM | HEIGHT: 69 IN

## 2022-12-06 LAB
ESTROGEN SERPL-MCNC: NORMAL PG/ML
INSULIN SERPL-ACNC: NORMAL U[IU]/ML
LAB AP CLINICAL INFORMATION: NORMAL
LAB AP GROSS DESCRIPTION: NORMAL
LAB AP REPORT FOOTNOTES: NORMAL
T3RU NFR SERPL: NORMAL %

## 2022-12-08 ENCOUNTER — TELEPHONE (OUTPATIENT)
Dept: ENDOSCOPY | Facility: HOSPITAL | Age: 62
End: 2022-12-08
Payer: MEDICAID

## 2022-12-08 NOTE — PROGRESS NOTES
Please let patient know that polyps removed were adenoma polyps.   These types of polyps are not cancer, but they are pre-cancerous (meaning that they can turn into cancers). Removing the polyp removes the risk of it becoming cancer.  Repeat colonoscopy is recommended in 5 years.

## 2022-12-08 NOTE — TELEPHONE ENCOUNTER
Patient contacted and given results. Patient verbalized understanding.     ----- Message from Nii Rudolph MD sent at 12/8/2022  6:50 AM CST -----  Please let patient know that polyps removed were adenoma polyps.   These types of polyps are not cancer, but they are pre-cancerous (meaning that they can turn into cancers). Removing the polyp removes the risk of it becoming cancer.  Repeat colonoscopy is recommended in 5 years.

## 2023-02-22 ENCOUNTER — OFFICE VISIT (OUTPATIENT)
Dept: INTERNAL MEDICINE | Facility: CLINIC | Age: 63
End: 2023-02-22
Payer: MEDICAID

## 2023-02-22 VITALS
HEIGHT: 69 IN | DIASTOLIC BLOOD PRESSURE: 79 MMHG | BODY MASS INDEX: 29.26 KG/M2 | WEIGHT: 197.56 LBS | TEMPERATURE: 98 F | HEART RATE: 94 BPM | SYSTOLIC BLOOD PRESSURE: 138 MMHG | RESPIRATION RATE: 16 BRPM

## 2023-02-22 DIAGNOSIS — Z00.00 ROUTINE ADULT HEALTH MAINTENANCE: ICD-10-CM

## 2023-02-22 DIAGNOSIS — G40.909 SEIZURE DISORDER: ICD-10-CM

## 2023-02-22 DIAGNOSIS — N40.0 BENIGN PROSTATIC HYPERPLASIA WITHOUT LOWER URINARY TRACT SYMPTOMS: ICD-10-CM

## 2023-02-22 DIAGNOSIS — N40.0 BENIGN PROSTATIC HYPERPLASIA, UNSPECIFIED WHETHER LOWER URINARY TRACT SYMPTOMS PRESENT: ICD-10-CM

## 2023-02-22 DIAGNOSIS — Z87.891 HISTORY OF TOBACCO USE: ICD-10-CM

## 2023-02-22 DIAGNOSIS — I10 HYPERTENSION, UNSPECIFIED TYPE: Primary | ICD-10-CM

## 2023-02-22 PROCEDURE — 99213 OFFICE O/P EST LOW 20 MIN: CPT | Mod: PBBFAC

## 2023-02-22 RX ORDER — LOSARTAN POTASSIUM 100 MG/1
100 TABLET ORAL DAILY
Qty: 90 TABLET | Refills: 3 | Status: SHIPPED | OUTPATIENT
Start: 2023-02-22 | End: 2024-02-29 | Stop reason: SDUPTHER

## 2023-02-22 RX ORDER — FINASTERIDE 5 MG/1
5 TABLET, FILM COATED ORAL DAILY
Qty: 90 TABLET | Refills: 3 | Status: SHIPPED | OUTPATIENT
Start: 2023-02-22 | End: 2023-11-30 | Stop reason: SDUPTHER

## 2023-02-22 RX ORDER — PHENYTOIN SODIUM 100 MG/1
CAPSULE, EXTENDED RELEASE ORAL
Qty: 150 CAPSULE | Refills: 11 | Status: SHIPPED | OUTPATIENT
Start: 2023-02-22 | End: 2024-04-03 | Stop reason: SDUPTHER

## 2023-02-22 NOTE — PROGRESS NOTES
Faculty addendum:     I have reviewed the patients history, residents  findings on physical examination, diagnosis and treatment plan. Care provided was reasonable and necessary.   Patient on phenytoin for epilepsy, vitamin-D checked on prior appointment at 52, normal.  No need for osteoporosis screening at this time.

## 2023-02-22 NOTE — PROGRESS NOTES
[facility] INTERNAL MEDICINE  OUTPATIENT OFFICE VISIT NOTE    SUBJECTIVE:   Chief Complaint: Routine follow up for HTN, BPH, and seizure disorder    HPI: Mr. Vences is a 62 yom with PMHx significant for hypertension, BPH, and seizure disorder who presents to [facility] IM [facility] clinic for routine follow up, last seen in clinic on 11/2/22.  Mr. Vences has a PMHx that is significant for hypertension that is well controlled, and a seizure disorder with his last known seizure approximately 3-4 years ago. He states that he has been on phenytoin for greater than 10 years, his last seizure approximately 3-4 years ago was related to medication noncompliance at that time.  Today, he has no acute complaints, reports strict medication compliance.        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  Initially stated that he had quit smoking, though at previous visit reported intermittent marijuana use for the past several years.     Past Medical History:   Diagnosis Date    BPH (benign prostatic hyperplasia)     Hypertension     Seizures        No past surgical history on file.    Social History     Socioeconomic History    Marital status:    Occupational History    Occupation:    Tobacco Use    Smoking status: Former    Smokeless tobacco: Never    Tobacco comments:     Quit 27 years ago   Substance and Sexual Activity    Alcohol use: Yes     Alcohol/week: 1.0 standard drink     Types: 1 Glasses of wine per week     Comment: OCCASIONAL WINE DRINKER    Drug use: Yes     Frequency: 7.0 times per week     Types: Marijuana    Sexual activity: Not Currently           Review of Systems   Constitutional:  Negative for chills, fever, malaise/fatigue and weight loss.   Respiratory:  Negative for cough, sputum  production and shortness of breath.    Cardiovascular:  Negative for chest pain, orthopnea and leg swelling.   Gastrointestinal:  Negative for abdominal pain, constipation, diarrhea, nausea and vomiting.   Genitourinary:  Negative for dysuria and urgency.   Musculoskeletal:  Negative for back pain and joint pain.   Neurological:  Negative for sensory change and focal weakness.           OBJECTIVE:     Vital signs:   There were no vitals taken for this visit.     Physical Examination:  General: Well nourished w/o distress  Pulm: CTA bilaterally, normal work of breathing  CV: S1, S2 w/o murmurs or gallops; no edema noted  GI: Soft, non-tender to palpation  MSK: Full ROM of all extremities and spine w/o limitation or discomfort  Derm: No rashes, abnormal bruising, or skin lesions  Neuro: AAOx4;motor/sensory function intact, no gross motor deficits noted on exam   Psych: Cooperative; appropriate mood and affect, answers all questions appropriately       Current Outpatient Medications   Medication Instructions    finasteride (PROSCAR) 5 mg, Oral, Daily    losartan (COZAAR) 100 mg, Oral, Daily    phenytoin (DILANTIN) 100 MG ER capsule 2 CAPS IN AM, 3 CAPS AT BEDTIME         ASSESSMENT & PLAN:   Hypertension  -/79  -Continue losartan 100 mg daily    Seizure disorder  -Last seizure approximately 3 years ago, patient correlates this with medication noncompliance at the time.  -Continue current regimen of phenytoin (200 mg every morning, 300 mg every afternoon, states he has been on this regiment for approximately 10 years)    BPH  -States he was previously diagnosed with BPH in the past, takes finasteride 5 mg daily, denies dysuria, denies frequency or dribbling  -PSA .36 on 8/11/22    Health maintenance  -Most recent labs from 8/11/22 reviewed, essentially WNL  -Colonoscopy performed 12/1/22: 4mm sessile polyp in descedning colon, 3mm sessily polyp in sigmoid colon.  Pathology consistent with hyperplastic polyp and  adenomatous polyp respectively.  Recommendations to repeat colonoscopy in 5 years   -declined flu vaccine  -declined Shingles vaccination   -takes daily aspirin  -Hep panel, syphilis screening negative on 8/11/22  -low dose lung CA screen negative on 8/23/22, recommendations to continue annual screening      RTC in 3 months    Severiano Vo DO  [facility] PGY3

## 2023-05-16 ENCOUNTER — OFFICE VISIT (OUTPATIENT)
Dept: INTERNAL MEDICINE | Facility: CLINIC | Age: 63
End: 2023-05-16
Payer: MEDICAID

## 2023-05-16 VITALS
TEMPERATURE: 98 F | SYSTOLIC BLOOD PRESSURE: 134 MMHG | HEIGHT: 66 IN | WEIGHT: 197.63 LBS | HEART RATE: 64 BPM | BODY MASS INDEX: 31.76 KG/M2 | DIASTOLIC BLOOD PRESSURE: 82 MMHG | RESPIRATION RATE: 18 BRPM

## 2023-05-16 DIAGNOSIS — N40.0 BENIGN PROSTATIC HYPERPLASIA, UNSPECIFIED WHETHER LOWER URINARY TRACT SYMPTOMS PRESENT: ICD-10-CM

## 2023-05-16 DIAGNOSIS — I10 HYPERTENSION, UNSPECIFIED TYPE: Primary | ICD-10-CM

## 2023-05-16 DIAGNOSIS — Z87.891 HISTORY OF TOBACCO USE: ICD-10-CM

## 2023-05-16 DIAGNOSIS — Z00.00 ROUTINE ADULT HEALTH MAINTENANCE: ICD-10-CM

## 2023-05-16 DIAGNOSIS — R56.9 SEIZURE: ICD-10-CM

## 2023-05-16 PROCEDURE — 99213 OFFICE O/P EST LOW 20 MIN: CPT | Mod: PBBFAC

## 2023-05-16 NOTE — PROGRESS NOTES
[facility] INTERNAL MEDICINE  OUTPATIENT OFFICE VISIT NOTE    SUBJECTIVE:   Chief Complaint: Routine follow up for HTN, BPH, and seizure disorder    HPI: Mr. Vences is a 62 yom with PMHx significant for hypertension, BPH, and seizure disorder who presents to [facility] IM [facility] clinic for routine follow up, last seen in clinic on 2/22/23.  Mr. Vences has a PMHx that is significant for hypertension that is well controlled, and a seizure disorder with his last known seizure approximately 3-4 years ago. He states that he has been on phenytoin for greater than 10 years, his last seizure approximately 3-4 years ago was related to medication noncompliance at that time.  Today, he has no acute complaints, reports strict medication compliance.        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  Initially stated that he had quit smoking, though at previous visit reported intermittent marijuana use for the past several years.     Past Medical History:   Diagnosis Date    BPH (benign prostatic hyperplasia)     Hypertension     Seizures        History reviewed. No pertinent surgical history.    Social History     Socioeconomic History    Marital status:    Occupational History    Occupation:    Tobacco Use    Smoking status: Former    Smokeless tobacco: Never    Tobacco comments:     Quit 27 years ago   Substance and Sexual Activity    Alcohol use: Yes     Alcohol/week: 1.0 standard drink     Types: 1 Glasses of wine per week     Comment: OCCASIONAL WINE DRINKER    Drug use: Yes     Frequency: 7.0 times per week     Types: Marijuana    Sexual activity: Not Currently           Review of Systems   Constitutional:  Negative for chills, fever, malaise/fatigue and weight loss.   Respiratory:  Negative for  "cough, sputum production and shortness of breath.    Cardiovascular:  Negative for chest pain, orthopnea and leg swelling.   Gastrointestinal:  Negative for abdominal pain, constipation, diarrhea, nausea and vomiting.   Genitourinary:  Negative for dysuria and urgency.   Musculoskeletal:  Negative for back pain and joint pain.   Neurological:  Negative for sensory change and focal weakness.           OBJECTIVE:     Vital signs:   /82 (BP Location: Right arm, Patient Position: Sitting, BP Method: Medium (Automatic))   Pulse 64   Temp 97.7 °F (36.5 °C) (Oral)   Resp 18   Ht 5' 6" (1.676 m)   Wt 89.6 kg (197 lb 9.6 oz)   BMI 31.89 kg/m²      Physical Examination:  General: Well nourished w/o distress  Pulm: CTA bilaterally, normal work of breathing  CV: S1, S2 w/o murmurs or gallops; no edema noted  GI: Soft, non-tender to palpation  MSK: Full ROM of all extremities and spine w/o limitation or discomfort  Derm: No rashes, abnormal bruising, or skin lesions  Neuro: AAOx4;motor/sensory function intact, no gross motor deficits noted on exam   Psych: Cooperative; appropriate mood and affect, answers all questions appropriately       Current Outpatient Medications   Medication Instructions    finasteride (PROSCAR) 5 mg, Oral, Daily    losartan (COZAAR) 100 mg, Oral, Daily    phenytoin (DILANTIN) 100 MG ER capsule 2 CAPS IN AM, 3 CAPS AT BEDTIME         ASSESSMENT & PLAN:   Hypertension  -BP today 134/82  -Continue losartan 100 mg daily    Seizure disorder  -Last seizure >3 years ago, patient correlates this with medication noncompliance at the time.  -Continue current regimen of phenytoin (200 mg every morning, 300 mg every afternoon, states he has been on this regiment for approximately 10 years)    BPH  -States he was previously diagnosed with BPH in the past, takes finasteride 5 mg daily, denies dysuria, denies frequency or dribbling  -PSA .36 on 8/11/22    Health maintenance  -Most recent labs from 8/11/22 " reviewed, essentially WNL  -Colonoscopy performed 12/1/22: 4mm sessile polyp in descedning colon, 3mm sessily polyp in sigmoid colon.  Pathology consistent with hyperplastic polyp and adenomatous polyp respectively.  Recommendations to repeat colonoscopy in 5 years   -declined flu vaccine  -declined Shingles vaccination   -takes daily aspirin  -Hep panel, syphilis screening negative on 8/11/22  -low dose lung CA screen negative on 8/23/22, recommendations to continue annual screening      RTC in 3 months    Severiano Vo DO  [employer] Internal Medicine PGY3

## 2023-11-29 NOTE — PROGRESS NOTES
INTERNAL MEDICINE RESIDENT CLINIC  CLINIC NOTE    Patient Name: Severiano Vences Jr.  YOB: 1960  Chief Complaint: Establish Care (Just want to follow up with general health care)  PRESENTING HISTORY       History of Present Illness:  Patient is a 63 y.o. male with a pmhx of solitary pulmonary nodule,  hypertension, BPH, and seizure disorder who presents to [facility] IM [facility] clinic for routine follow up, last seen in clinic on 5/16/23.   Previous LDCT on 8/23/22: 3 mm nodule present in right lower lobe.  He denies any respiratory symptoms.  He is a former smoker and has quit smoking >10 years ago.  Patient has no complaints today.  He is compliant with all of his medications.        Constitutional: no fever/chills  EENT: no sore throat, ear pain, sinus pain/congestion, nasal congestion/drainage  Respiratory: no cough, no wheezing, no shortness of breath  Cardiovascular: no chest pain, no palpitations, no edema  Gastrointestinal: no nausea, vomiting, or diarrhea. No abdominal pain  Genitourinary: no dysuria, no urinary frequency or urgency, no hematuria  Integumentary: no skin rash or abnormal lesion  Neurologic: no headache, no dizziness, no weakness or numbness    PAST HISTORY:     Past Medical History:   Diagnosis Date    BPH (benign prostatic hyperplasia)     Hypertension     Seizures        No past surgical history on file.    Family History   Problem Relation Age of Onset    Colon cancer Father        Social History     Socioeconomic History    Marital status:    Occupational History    Occupation:    Tobacco Use    Smoking status: Former    Smokeless tobacco: Never    Tobacco comments:     Quit 27 years ago   Substance and Sexual Activity    Alcohol use: Yes     Alcohol/week: 3.0 standard drinks of alcohol     Types: 3 Glasses of wine per week     Comment: OCCASIONAL WINE DRINKER    Drug use: Yes     Frequency: 7.0 times per week     Types: Marijuana    Sexual activity: Not Currently      Social Determinants of Health     Financial Resource Strain: Low Risk  (11/30/2023)    Overall Financial Resource Strain (CARDIA)     Difficulty of Paying Living Expenses: Not hard at all   Food Insecurity: No Food Insecurity (11/30/2023)    Hunger Vital Sign     Worried About Running Out of Food in the Last Year: Never true     Ran Out of Food in the Last Year: Never true   Transportation Needs: No Transportation Needs (11/30/2023)    PRAPARE - Transportation     Lack of Transportation (Medical): No     Lack of Transportation (Non-Medical): No   Physical Activity: Inactive (11/30/2023)    Exercise Vital Sign     Days of Exercise per Week: 0 days     Minutes of Exercise per Session: 0 min   Stress: No Stress Concern Present (11/30/2023)    [employer] [place] [employer] - Occupational Stress Questionnaire     Feeling of Stress : Only a little   Social Connections: Socially Isolated (11/30/2023)    Social Connection and Isolation Panel [NHANES]     Frequency of Communication with Friends and Family: Once a week     Frequency of Social Gatherings with Friends and Family: Once a week     Attends [employer] Services: Never     Active Member of Clubs or Organizations: No     Attends Club or Organization Meetings: Never     Marital Status:    Housing Stability: Low Risk  (11/30/2023)    Housing Stability Vital Sign     Unable to Pay for Housing in the Last Year: No     Number of Places Lived in the Last Year: 1     Unstable Housing in the Last Year: No       MEDICATIONS & ALLERGIES:     Current Outpatient Medications on File Prior to Visit   Medication Sig    losartan (COZAAR) 100 MG tablet Take 1 tablet (100 mg total) by mouth once daily.    phenytoin (DILANTIN) 100 MG ER capsule 2 CAPS IN AM, 3 CAPS AT BEDTIME    rosuvastatin (CRESTOR) 5 MG tablet Take 5 mg by mouth every evening. States taking prn    [DISCONTINUED] finasteride (PROSCAR) 5 mg tablet Take 1 tablet (5 mg total) by mouth once daily.  "    No current facility-administered medications on file prior to visit.       Review of patient's allergies indicates:  No Known Allergies    OBJECTIVE:   Vital Signs:  Vitals:    11/30/23 1331   BP: 137/79   Pulse: 86   Resp: 18   Temp: 98.1 °F (36.7 °C)   TempSrc: Oral   SpO2: 95%   Weight: 88.4 kg (194 lb 12.8 oz)   Height: 5' 6" (1.676 m)       Recent Results (from the past 24 hour(s))   POCT HEMOGLOBIN A1C    Collection Time: 11/30/23  2:17 PM   Result Value Ref Range    Hemoglobin A1C, POC 5.8 %         General: well-developed, well-nourished, no acute distress  Respiratory: clear to auscultation bilaterally without wheezes, rales, rhonchi  Cardiovascular: regular rate and rhythm without murmurs, gallops or rubs, normal S1 S2.   Gastrointestinal: soft, non-tender, non-distended with normal bowel sounds in all four quadrants. No masses palpated  Musculoskeletal: full range of motion of all extremities without limitation or discomfort.  Integumentary: no rashes or skin lesions present, no erythema  Neurologic: AAO x 3, no dysarthria.  Psychiatric: cooperative with exam, good eye contact, no hallucinations.      Laboratory  Lab Results   Component Value Date    WBC 6.9 08/11/2022    HGB 14.0 08/11/2022    HCT 41.9 (L) 08/11/2022    MCV 87.5 08/11/2022     08/11/2022     @[id number](GLU,NA,K,Cl,CO2,BUN,Creatinine,Calcium,MG)@  No results found for: "INR", "PROTIME"  Lab Results   Component Value Date    HGBA1C 5.7 08/25/2021     No results for input(s): "POCTGLUCOSE" in the last 72 hours.    Diagnostic Results:    ASSESSMENT & PLAN:     Hypertension  -BP today 137/79.   -Continue losartan 100 mg daily    Seizure disorder  -Last seizure >3 years ago, patient correlates this with medication noncompliance at the time.  -Continue current regimen of phenytoin (200 mg every morning, 300 mg every afternoon, states he has been on this regiment for approximately 10 years)    BPH  -States he was previously " diagnosed with BPH in the past, takes finasteride 5 mg daily, denies dysuria, denies frequency or dribbling  -PSA .36 on 22    Solitary pulmonary nodule  -LDCT 22- 3 mm nodule present in right lower lobe.  -continue annual screening.        Health Maintenance   TDAP:  2020  Influenza vaccine: Declined  Shingrix vaccine: Declined  Colon CA screenin2022: 4mm sessile polyp in descedning colon, 3mm sessily polyp in sigmoid colon.  Pathology consistent with hyperplastic polyp and adenomatous polyp respectively.  Recommendations to repeat colonoscopy in 5 years   Lung CA screenin22: 3 mm nodule present in right lower lobe.  Continue annual screening.  Hepatitis C:  2022  HIV: declined  COVID-19: declined        RTC in 4 months.       Today: CMP, CBC, LDCT, lipid      Eduin Vargas MD  Internal Medicine PGY-1

## 2023-11-30 ENCOUNTER — OFFICE VISIT (OUTPATIENT)
Dept: INTERNAL MEDICINE | Facility: CLINIC | Age: 63
End: 2023-11-30
Payer: MEDICAID

## 2023-11-30 VITALS
SYSTOLIC BLOOD PRESSURE: 137 MMHG | OXYGEN SATURATION: 95 % | DIASTOLIC BLOOD PRESSURE: 79 MMHG | HEART RATE: 86 BPM | BODY MASS INDEX: 31.31 KG/M2 | TEMPERATURE: 98 F | WEIGHT: 194.81 LBS | RESPIRATION RATE: 18 BRPM | HEIGHT: 66 IN

## 2023-11-30 DIAGNOSIS — D12.5 ADENOMATOUS POLYP OF SIGMOID COLON: ICD-10-CM

## 2023-11-30 DIAGNOSIS — Z00.00 HEALTHCARE MAINTENANCE: Primary | ICD-10-CM

## 2023-11-30 DIAGNOSIS — N40.0 BENIGN PROSTATIC HYPERPLASIA, UNSPECIFIED WHETHER LOWER URINARY TRACT SYMPTOMS PRESENT: ICD-10-CM

## 2023-11-30 DIAGNOSIS — R91.1 SOLITARY PULMONARY NODULE: ICD-10-CM

## 2023-11-30 DIAGNOSIS — D12.4 ADENOMATOUS POLYP OF DESCENDING COLON: ICD-10-CM

## 2023-11-30 LAB — HBA1C MFR BLD: 5.8 %

## 2023-11-30 PROCEDURE — 99214 OFFICE O/P EST MOD 30 MIN: CPT | Mod: PBBFAC

## 2023-11-30 PROCEDURE — 83036 HEMOGLOBIN GLYCOSYLATED A1C: CPT | Mod: PBBFAC

## 2023-11-30 RX ORDER — FINASTERIDE 5 MG/1
5 TABLET, FILM COATED ORAL DAILY
Qty: 90 TABLET | Refills: 3 | Status: SHIPPED | OUTPATIENT
Start: 2023-11-30

## 2023-11-30 RX ORDER — ROSUVASTATIN CALCIUM 5 MG/1
5 TABLET, COATED ORAL NIGHTLY
COMMUNITY
Start: 2023-10-22

## 2023-12-04 ENCOUNTER — LAB VISIT (OUTPATIENT)
Dept: LAB | Facility: HOSPITAL | Age: 63
End: 2023-12-04
Payer: MEDICAID

## 2023-12-04 DIAGNOSIS — Z00.00 HEALTHCARE MAINTENANCE: ICD-10-CM

## 2023-12-04 LAB
ALBUMIN SERPL-MCNC: 4.2 G/DL (ref 3.4–4.8)
ALBUMIN/GLOB SERPL: 1.4 RATIO (ref 1.1–2)
ALP SERPL-CCNC: 91 UNIT/L (ref 40–150)
ALT SERPL-CCNC: 28 UNIT/L (ref 0–55)
AST SERPL-CCNC: 14 UNIT/L (ref 5–34)
BASOPHILS # BLD AUTO: 0.03 X10(3)/MCL
BASOPHILS NFR BLD AUTO: 0.5 %
BILIRUB SERPL-MCNC: 0.3 MG/DL
BUN SERPL-MCNC: 17.2 MG/DL (ref 8.4–25.7)
CALCIUM SERPL-MCNC: 9.1 MG/DL (ref 8.8–10)
CHLORIDE SERPL-SCNC: 111 MMOL/L (ref 98–107)
CHOLEST SERPL-MCNC: 196 MG/DL
CHOLEST/HDLC SERPL: 4 {RATIO} (ref 0–5)
CO2 SERPL-SCNC: 26 MMOL/L (ref 23–31)
CREAT SERPL-MCNC: 0.85 MG/DL (ref 0.73–1.18)
EOSINOPHIL # BLD AUTO: 0.09 X10(3)/MCL (ref 0–0.9)
EOSINOPHIL NFR BLD AUTO: 1.4 %
ERYTHROCYTE [DISTWIDTH] IN BLOOD BY AUTOMATED COUNT: 15 % (ref 11.5–17)
GFR SERPLBLD CREATININE-BSD FMLA CKD-EPI: >60 MLS/MIN/1.73/M2
GLOBULIN SER-MCNC: 3 GM/DL (ref 2.4–3.5)
GLUCOSE SERPL-MCNC: 99 MG/DL (ref 82–115)
HCT VFR BLD AUTO: 43.1 % (ref 42–52)
HDLC SERPL-MCNC: 52 MG/DL (ref 35–60)
HGB BLD-MCNC: 14.4 G/DL (ref 14–18)
IMM GRANULOCYTES # BLD AUTO: 0 X10(3)/MCL (ref 0–0.04)
IMM GRANULOCYTES NFR BLD AUTO: 0 %
LDLC SERPL CALC-MCNC: 125 MG/DL (ref 50–140)
LYMPHOCYTES # BLD AUTO: 2.03 X10(3)/MCL (ref 0.6–4.6)
LYMPHOCYTES NFR BLD AUTO: 31.8 %
MCH RBC QN AUTO: 29.6 PG (ref 27–31)
MCHC RBC AUTO-ENTMCNC: 33.4 G/DL (ref 33–36)
MCV RBC AUTO: 88.7 FL (ref 80–94)
MONOCYTES # BLD AUTO: 0.62 X10(3)/MCL (ref 0.1–1.3)
MONOCYTES NFR BLD AUTO: 9.7 %
NEUTROPHILS # BLD AUTO: 3.62 X10(3)/MCL (ref 2.1–9.2)
NEUTROPHILS NFR BLD AUTO: 56.6 %
NRBC BLD AUTO-RTO: 0 %
PLATELET # BLD AUTO: 297 X10(3)/MCL (ref 130–400)
PMV BLD AUTO: 7.8 FL (ref 7.4–10.4)
POTASSIUM SERPL-SCNC: 4.1 MMOL/L (ref 3.5–5.1)
PROT SERPL-MCNC: 7.2 GM/DL (ref 5.8–7.6)
RBC # BLD AUTO: 4.86 X10(6)/MCL (ref 4.7–6.1)
SODIUM SERPL-SCNC: 144 MMOL/L (ref 136–145)
TRIGL SERPL-MCNC: 96 MG/DL (ref 34–140)
VLDLC SERPL CALC-MCNC: 19 MG/DL
WBC # SPEC AUTO: 6.39 X10(3)/MCL (ref 4.5–11.5)

## 2023-12-04 PROCEDURE — 80053 COMPREHEN METABOLIC PANEL: CPT

## 2023-12-04 PROCEDURE — 80061 LIPID PANEL: CPT

## 2023-12-04 PROCEDURE — 85025 COMPLETE CBC W/AUTO DIFF WBC: CPT

## 2023-12-04 PROCEDURE — 36415 COLL VENOUS BLD VENIPUNCTURE: CPT

## 2024-02-29 DIAGNOSIS — I10 HYPERTENSION, UNSPECIFIED TYPE: ICD-10-CM

## 2024-02-29 RX ORDER — LOSARTAN POTASSIUM 100 MG/1
100 TABLET ORAL DAILY
Qty: 90 TABLET | Refills: 3 | Status: SHIPPED | OUTPATIENT
Start: 2024-02-29 | End: 2024-04-03 | Stop reason: SDUPTHER

## 2024-04-03 DIAGNOSIS — G40.909 SEIZURE DISORDER: ICD-10-CM

## 2024-04-03 DIAGNOSIS — I10 HYPERTENSION, UNSPECIFIED TYPE: ICD-10-CM

## 2024-04-03 RX ORDER — PHENYTOIN SODIUM 100 MG/1
CAPSULE, EXTENDED RELEASE ORAL
Qty: 150 CAPSULE | Refills: 11 | Status: SHIPPED | OUTPATIENT
Start: 2024-04-03

## 2024-04-03 RX ORDER — LOSARTAN POTASSIUM 100 MG/1
100 TABLET ORAL DAILY
Qty: 90 TABLET | Refills: 3 | Status: SHIPPED | OUTPATIENT
Start: 2024-04-03

## 2024-04-15 NOTE — PROGRESS NOTES
INTERNAL MEDICINE RESIDENT CLINIC  CLINIC NOTE    Patient Name: Severinao Vences Jr.  YOB: 1960  Chief Complaint: Follow-up (Intermittent right side flank pain )  PRESENTING HISTORY       History of Present Illness:  Patient is a 63 y.o. male with a pmhx of solitary pulmonary nodule,  hypertension, BPH, and seizure disorder who presents to clinic for routine follow up, last seen in clinic on 11/30/2024.  Patient was a former smoker and quit smoking >10 years ago.  States that he has been feeling well.  Endorses intermittent right flank pain that occurs a couple times a month, this has been occurring over the past 1-2 years.  States that the pain is relieved with bowel movements.  Rates the pain 3/10 in severity.  Denies any dysuria, urgency, frequency.  Also states that he has had intentional 5 lb over the past 6 months.  States that he was in eating a well-balanced diet and has been staying active.  He sleeps with 2 pillows at night, endorses occasional shortness of breath when lying down flat.  He is compliant with all of his medications.        Constitutional: no fever/chills  EENT: no sore throat, ear pain, sinus pain/congestion, nasal congestion/drainage  Respiratory: no cough, no wheezing, no shortness of breath  Cardiovascular: no chest pain, no palpitations, no edema  Gastrointestinal: no nausea, vomiting, or diarrhea. No abdominal pain  Genitourinary: no dysuria, no urinary frequency or urgency, no hematuria  Integumentary: no skin rash or abnormal lesion  Musculoskeletal:  + Right flank pain.  Neurologic: no headache, no dizziness, no weakness or numbness    PAST HISTORY:     Past Medical History:   Diagnosis Date    BPH (benign prostatic hyperplasia)     Hypertension     Seizures        No past surgical history on file.    Family History   Problem Relation Name Age of Onset    Colon cancer Father         Social History     Socioeconomic History    Marital status:    Occupational  History    Occupation:    Tobacco Use    Smoking status: Former    Smokeless tobacco: Never    Tobacco comments:     Quit 27 years ago   Substance and Sexual Activity    Alcohol use: Yes     Alcohol/week: 3.0 standard drinks of alcohol     Types: 3 Glasses of wine per week     Comment: OCCASIONAL WINE DRINKER    Drug use: Yes     Frequency: 7.0 times per week     Types: Marijuana    Sexual activity: Not Currently     Social Determinants of Health     Financial Resource Strain: Low Risk  (11/30/2023)    Overall Financial Resource Strain (CARDIA)     Difficulty of Paying Living Expenses: Not hard at all   Food Insecurity: No Food Insecurity (11/30/2023)    Hunger Vital Sign     Worried About Running Out of Food in the Last Year: Never true     Ran Out of Food in the Last Year: Never true   Transportation Needs: No Transportation Needs (11/30/2023)    PRAPARE - Transportation     Lack of Transportation (Medical): No     Lack of Transportation (Non-Medical): No   Physical Activity: Inactive (11/30/2023)    Exercise Vital Sign     Days of Exercise per Week: 0 days     Minutes of Exercise per Session: 0 min   Stress: No Stress Concern Present (11/30/2023)    [employer] - Occupational Stress Questionnaire     Feeling of Stress : Only a little   Social Connections: Socially Isolated (11/30/2023)    Social Connection and Isolation Panel [NHANES]     Frequency of Communication with Friends and Family: Once a week     Frequency of Social Gatherings with Friends and Family: Once a week     Attends Yazidism Services: Never     Active Member of Clubs or Organizations: No     Attends Club or Organization Meetings: Never     Marital Status:    Housing Stability: Low Risk  (11/30/2023)    Housing Stability Vital Sign     Unable to Pay for Housing in the Last Year: No     Number of Places Lived in the Last Year: 1     Unstable Housing in the Last Year: No       MEDICATIONS & ALLERGIES:  "    Current Outpatient Medications   Medication Sig Dispense Refill    finasteride (PROSCAR) 5 mg tablet Take 1 tablet (5 mg total) by mouth once daily. 90 tablet 3    losartan (COZAAR) 100 MG tablet Take 1 tablet (100 mg total) by mouth once daily. 90 tablet 3    phenytoin (DILANTIN) 100 MG ER capsule 2 CAPS IN AM, 3 CAPS AT BEDTIME 150 capsule 11    rosuvastatin (CRESTOR) 5 MG tablet Take 5 mg by mouth every evening. States taking prn       No current facility-administered medications for this visit.       Review of patient's allergies indicates:  No Known Allergies    OBJECTIVE:   Vital Signs:  Vitals:    04/16/24 1233   BP: 129/81   Pulse: 98   Resp: 18   Temp: 98.6 °F (37 °C)   TempSrc: Oral   SpO2: 97%   Weight: 85.9 kg (189 lb 6.4 oz)   Height: 5' 6" (1.676 m)         No results found for this or any previous visit (from the past 24 hour(s)).        General: well-developed, well-nourished, no acute distress  Respiratory: clear to auscultation bilaterally without wheezes, rales, rhonchi  Cardiovascular: regular rate and rhythm without murmurs, gallops or rubs, normal S1 S2.  JVD present.  Gastrointestinal: soft, non-tender, non-distended with normal bowel sounds in all four quadrants. No masses palpated  Musculoskeletal: full range of motion of all extremities without limitation or discomfort.  Integumentary: no rashes or skin lesions present, no erythema  Neurologic: AAO x 3, no dysarthria.  Psychiatric: cooperative with exam, good eye contact, no hallucinations.      Laboratory  Lab Results   Component Value Date    WBC 6.39 12/04/2023    HGB 14.4 12/04/2023    HCT 43.1 12/04/2023    MCV 88.7 12/04/2023     12/04/2023     @[id number](GLU,NA,K,Cl,CO2,BUN,Creatinine,Calcium,MG)@  No results found for: "INR", "PROTIME"  Lab Results   Component Value Date    HGBA1C 5.7 08/25/2021     No results for input(s): "POCTGLUCOSE" in the last 72 hours.    Diagnostic Results:    ASSESSMENT & PLAN: "     Hypertension  -BP today 129/81.  -Continue losartan 100 mg daily    Right flank pain  -intermittent pain that is occurred over the past 1-2 years.  -suspect this is likely musculoskeletal in nature although will do further workup.  -ordered urinalysis, retroperitoneal ultrasound.    JVD  -no significant cardiovascular history, no prior echocardiogram.  -JVD seen on physical exam inpatient endorses intermittent orthopnea.  -ordered echocardiogram    Seizure disorder  -Last seizure >3 years ago, patient correlates this with medication noncompliance at the time.  -Continue current regimen of phenytoin (200 mg every morning, 300 mg every afternoon, states he has been on this regiment for approximately 10 years)    BPH  -States he was previously diagnosed with BPH in the past, takes finasteride 5 mg daily, denies dysuria, denies frequency or dribbling  -PSA .36 on 22  -repeat PSA ordered.    Solitary pulmonary nodule  -LDCT 22- 3 mm nodule present in right lower lobe.  -LDCT 2023:  Stable 4 mm nodule on right major fissure.  Benign appearance.  -continue annual screening.        Health Maintenance   TDAP:  2020  Influenza vaccine: Declined  Shingrix vaccine: Declined  Colon CA screenin2022: 4mm sessile polyp in descedning colon, 3mm sessily polyp in sigmoid colon.  Pathology consistent with hyperplastic polyp and adenomatous polyp respectively.  Recommendations to repeat colonoscopy in 5 years   Lung CA screenin22: 3 mm nodule present in right lower lobe.  Continue annual screening.  LDCT 2023:  Stable 4 mm nodule on right major fissure.  Benign appearance.  Hepatitis C:  2022  HIV: declined  COVID-19: declined    Today:  Urinalysis, retroperitoneal ultrasound, echo, labs    RTC in 4 months.         Eduin Vargas MD  Internal Medicine PGY-1

## 2024-04-16 ENCOUNTER — OFFICE VISIT (OUTPATIENT)
Dept: INTERNAL MEDICINE | Facility: CLINIC | Age: 64
End: 2024-04-16
Payer: MEDICAID

## 2024-04-16 ENCOUNTER — LAB VISIT (OUTPATIENT)
Dept: LAB | Facility: HOSPITAL | Age: 64
End: 2024-04-16
Payer: MEDICAID

## 2024-04-16 VITALS
WEIGHT: 189.38 LBS | DIASTOLIC BLOOD PRESSURE: 81 MMHG | OXYGEN SATURATION: 97 % | TEMPERATURE: 99 F | RESPIRATION RATE: 18 BRPM | SYSTOLIC BLOOD PRESSURE: 129 MMHG | HEIGHT: 66 IN | HEART RATE: 98 BPM | BODY MASS INDEX: 30.44 KG/M2

## 2024-04-16 DIAGNOSIS — N40.0 BENIGN PROSTATIC HYPERPLASIA, UNSPECIFIED WHETHER LOWER URINARY TRACT SYMPTOMS PRESENT: ICD-10-CM

## 2024-04-16 DIAGNOSIS — Z00.00 HEALTHCARE MAINTENANCE: ICD-10-CM

## 2024-04-16 DIAGNOSIS — R91.1 SOLITARY PULMONARY NODULE: ICD-10-CM

## 2024-04-16 DIAGNOSIS — R56.9 SEIZURE: ICD-10-CM

## 2024-04-16 DIAGNOSIS — R09.89 JVD (JUGULAR VENOUS DISTENSION): ICD-10-CM

## 2024-04-16 DIAGNOSIS — I10 HYPERTENSION, UNSPECIFIED TYPE: ICD-10-CM

## 2024-04-16 DIAGNOSIS — R10.9 FLANK PAIN: Primary | ICD-10-CM

## 2024-04-16 LAB
PSA SERPL-MCNC: 0.35 NG/ML
T4 FREE SERPL-MCNC: 0.73 NG/DL (ref 0.7–1.48)
TSH SERPL-ACNC: 1.94 UIU/ML (ref 0.35–4.94)

## 2024-04-16 PROCEDURE — 36415 COLL VENOUS BLD VENIPUNCTURE: CPT

## 2024-04-16 PROCEDURE — 84153 ASSAY OF PSA TOTAL: CPT

## 2024-04-16 PROCEDURE — 84439 ASSAY OF FREE THYROXINE: CPT

## 2024-04-16 PROCEDURE — 99214 OFFICE O/P EST MOD 30 MIN: CPT | Mod: PBBFAC

## 2024-04-16 PROCEDURE — 84443 ASSAY THYROID STIM HORMONE: CPT

## 2024-05-01 ENCOUNTER — HOSPITAL ENCOUNTER (EMERGENCY)
Facility: HOSPITAL | Age: 64
Discharge: HOME | End: 2024-05-01
Attending: EMERGENCY MEDICINE

## 2024-05-01 VITALS
DIASTOLIC BLOOD PRESSURE: 87 MMHG | SYSTOLIC BLOOD PRESSURE: 159 MMHG | HEART RATE: 63 BPM | TEMPERATURE: 98 F | HEIGHT: 66 IN | BODY MASS INDEX: 30.82 KG/M2 | RESPIRATION RATE: 20 BRPM | WEIGHT: 191.81 LBS | OXYGEN SATURATION: 95 %

## 2024-05-01 DIAGNOSIS — R07.9 CHEST PAIN: Primary | ICD-10-CM

## 2024-05-01 LAB
ALBUMIN SERPL-MCNC: 3.9 G/DL (ref 3.4–4.8)
ALBUMIN/GLOB SERPL: 1.2 RATIO (ref 1.1–2)
ALP SERPL-CCNC: 93 UNIT/L (ref 40–150)
ALT SERPL-CCNC: 20 UNIT/L (ref 0–55)
APTT PPP: 28.9 SECONDS (ref 23.2–33.7)
AST SERPL-CCNC: 14 UNIT/L (ref 5–34)
BASOPHILS # BLD AUTO: 0.04 X10(3)/MCL
BASOPHILS NFR BLD AUTO: 0.7 %
BILIRUB SERPL-MCNC: 0.2 MG/DL
BUN SERPL-MCNC: 13.3 MG/DL (ref 8.4–25.7)
CALCIUM SERPL-MCNC: 9.4 MG/DL (ref 8.8–10)
CHLORIDE SERPL-SCNC: 109 MMOL/L (ref 98–107)
CK MB SERPL-MCNC: <1 NG/ML
CK SERPL-CCNC: 72 U/L (ref 30–200)
CO2 SERPL-SCNC: 21 MMOL/L (ref 23–31)
CREAT SERPL-MCNC: 0.83 MG/DL (ref 0.73–1.18)
EOSINOPHIL # BLD AUTO: 0.17 X10(3)/MCL (ref 0–0.9)
EOSINOPHIL NFR BLD AUTO: 2.9 %
ERYTHROCYTE [DISTWIDTH] IN BLOOD BY AUTOMATED COUNT: 13.8 % (ref 11.5–17)
GFR SERPLBLD CREATININE-BSD FMLA CKD-EPI: >60 MLS/MIN/1.73/M2
GLOBULIN SER-MCNC: 3.3 GM/DL (ref 2.4–3.5)
GLUCOSE SERPL-MCNC: 97 MG/DL (ref 82–115)
HCT VFR BLD AUTO: 42 % (ref 42–52)
HGB BLD-MCNC: 14.5 G/DL (ref 14–18)
HOLD SPECIMEN: NORMAL
HOLD SPECIMEN: NORMAL
IMM GRANULOCYTES # BLD AUTO: 0.01 X10(3)/MCL (ref 0–0.04)
IMM GRANULOCYTES NFR BLD AUTO: 0.2 %
INR PPP: 1
LYMPHOCYTES # BLD AUTO: 2.25 X10(3)/MCL (ref 0.6–4.6)
LYMPHOCYTES NFR BLD AUTO: 38.8 %
MCH RBC QN AUTO: 30 PG (ref 27–31)
MCHC RBC AUTO-ENTMCNC: 34.5 G/DL (ref 33–36)
MCV RBC AUTO: 87 FL (ref 80–94)
MONOCYTES # BLD AUTO: 0.53 X10(3)/MCL (ref 0.1–1.3)
MONOCYTES NFR BLD AUTO: 9.1 %
NEUTROPHILS # BLD AUTO: 2.8 X10(3)/MCL (ref 2.1–9.2)
NEUTROPHILS NFR BLD AUTO: 48.3 %
NRBC BLD AUTO-RTO: 0 %
OHS QRS DURATION: 96 MS
OHS QTC CALCULATION: 415 MS
PLATELET # BLD AUTO: 406 X10(3)/MCL (ref 130–400)
PMV BLD AUTO: 7.8 FL (ref 7.4–10.4)
POTASSIUM SERPL-SCNC: 3.8 MMOL/L (ref 3.5–5.1)
PROT SERPL-MCNC: 7.2 GM/DL (ref 5.8–7.6)
PROTHROMBIN TIME: 13.3 SECONDS (ref 11.4–14)
RBC # BLD AUTO: 4.83 X10(6)/MCL (ref 4.7–6.1)
SODIUM SERPL-SCNC: 140 MMOL/L (ref 136–145)
TROPONIN I SERPL-MCNC: <0.01 NG/ML (ref 0–0.04)
TROPONIN I SERPL-MCNC: <0.01 NG/ML (ref 0–0.04)
WBC # SPEC AUTO: 5.8 X10(3)/MCL (ref 4.5–11.5)

## 2024-05-01 PROCEDURE — 80053 COMPREHEN METABOLIC PANEL: CPT | Performed by: PHYSICIAN ASSISTANT

## 2024-05-01 PROCEDURE — 85730 THROMBOPLASTIN TIME PARTIAL: CPT | Performed by: PHYSICIAN ASSISTANT

## 2024-05-01 PROCEDURE — 84484 ASSAY OF TROPONIN QUANT: CPT | Performed by: PHYSICIAN ASSISTANT

## 2024-05-01 PROCEDURE — 85025 COMPLETE CBC W/AUTO DIFF WBC: CPT | Performed by: PHYSICIAN ASSISTANT

## 2024-05-01 PROCEDURE — 93005 ELECTROCARDIOGRAM TRACING: CPT

## 2024-05-01 PROCEDURE — 82550 ASSAY OF CK (CPK): CPT | Performed by: PHYSICIAN ASSISTANT

## 2024-05-01 PROCEDURE — 85610 PROTHROMBIN TIME: CPT | Performed by: PHYSICIAN ASSISTANT

## 2024-05-01 PROCEDURE — 82553 CREATINE MB FRACTION: CPT | Performed by: PHYSICIAN ASSISTANT

## 2024-05-01 PROCEDURE — 99285 EMERGENCY DEPT VISIT HI MDM: CPT | Mod: 25

## 2024-05-01 NOTE — ED PROVIDER NOTES
"Encounter Date: 5/1/2024       History     Chief Complaint   Patient presents with    Chest Pain     C/o midsternal chest pain this am after going to urgent care over the weekend for upper resp symptoms. States just has some discomfort and "little" pain but unable to describe it     Severiano Vences Jr. Is a 63 y.o. male with a history of HTN, BPH, and seizures who presents to the ED complaining of chest pain. Pt reports he was getting ready to go to work around 7:30 AM when he developed a "burning" pain in the center of his chest. Pain lasted approximately 5 minutes and then resolved spontaneously. No associated SOB, nausea, or diaphoresis. Pain did not radiate. No recurrence since that time. States he was sick over the weekend with a viral URI and has been coughing. Denies SOB at present, wheezing, palpitations, leg swelling, N/V/D. States he thinks it was heartburn, but wanted to come in for evaluation just in case. Non-smoker. No history of CAD.    The history is provided by the patient.     Review of patient's allergies indicates:  No Known Allergies  Past Medical History:   Diagnosis Date    BPH (benign prostatic hyperplasia)     Hypertension     Seizures      No past surgical history on file.  Family History   Problem Relation Name Age of Onset    Colon cancer Father       Social History     Tobacco Use    Smoking status: Former    Smokeless tobacco: Never    Tobacco comments:     Quit 27 years ago   Substance Use Topics    Alcohol use: Yes     Alcohol/week: 3.0 standard drinks of alcohol     Types: 3 Glasses of wine per week     Comment: OCCASIONAL WINE DRINKER    Drug use: Yes     Frequency: 7.0 times per week     Types: Marijuana     Review of Systems   Constitutional:  Negative for activity change, chills and fever.   HENT:  Negative for congestion and trouble swallowing.    Eyes:  Negative for photophobia and visual disturbance.   Respiratory:  Negative for chest tightness, shortness of breath and wheezing.  "   Cardiovascular:  Positive for chest pain. Negative for palpitations and leg swelling.   Gastrointestinal:  Negative for abdominal pain, constipation, diarrhea, nausea and vomiting.   Genitourinary:  Negative for dysuria, frequency, hematuria and urgency.   Musculoskeletal:  Negative for arthralgias, back pain and gait problem.   Skin:  Negative for color change and rash.   Neurological:  Negative for dizziness, syncope, weakness, light-headedness, numbness and headaches.   Psychiatric/Behavioral:  Negative for agitation and confusion. The patient is not nervous/anxious.        Physical Exam     Initial Vitals [05/01/24 0909]   BP Pulse Resp Temp SpO2   (!) 152/90 76 20 97.7 °F (36.5 °C) 97 %      MAP       --         Physical Exam    Nursing note and vitals reviewed.  Constitutional: He appears well-developed and well-nourished. No distress.   HENT:   Head: Normocephalic and atraumatic.   Mouth/Throat: No oropharyngeal exudate.   Eyes: EOM are normal. No scleral icterus.   Neck: Neck supple.   Normal range of motion.  Cardiovascular:  Normal rate and regular rhythm.           No murmur heard.  Pulmonary/Chest: No respiratory distress. He has no wheezes. He exhibits no tenderness.   Abdominal: Abdomen is soft. He exhibits no distension. There is no abdominal tenderness.   Musculoskeletal:         General: No edema. Normal range of motion.      Cervical back: Normal range of motion and neck supple.     Neurological: He is alert and oriented to person, place, and time. No cranial nerve deficit.   Skin: Skin is warm and dry. Capillary refill takes less than 2 seconds. No erythema.   Psychiatric: He has a normal mood and affect. Thought content normal.         ED Course   Procedures  Labs Reviewed   COMPREHENSIVE METABOLIC PANEL - Abnormal; Notable for the following components:       Result Value    Chloride 109 (*)     Carbon Dioxide 21 (*)     All other components within normal limits   CBC WITH DIFFERENTIAL -  Abnormal; Notable for the following components:    Platelet 406 (*)     All other components within normal limits   TROPONIN I - Normal   CK - Normal   CK-MB - Normal   APTT - Normal   PROTIME-INR - Normal   TROPONIN I - Normal   CBC W/ AUTO DIFFERENTIAL    Narrative:     The following orders were created for panel order CBC auto differential.  Procedure                               Abnormality         Status                     ---------                               -----------         ------                     CBC with Differential[[id number]]       Abnormal            Final result                 Please view results for these tests on the individual orders.   EXTRA TUBES    Narrative:     The following orders were created for panel order EXTRA TUBES.  Procedure                               Abnormality         Status                     ---------                               -----------         ------                     Red Top Hold[[id number]]                                    Final result               Gold Top Hold[[id number]]                                   Final result                 Please view results for these tests on the individual orders.   RED TOP HOLD   GOLD TOP HOLD        ECG Results              EKG 12-lead (In process)        Collection Time Result Time QRS Duration OHS QTC Calculation    05/01/24 09:10:40 05/01/24 09:33:08 96 415                     In process by Interface, Lab In [facility] (05/01/24 09:33:17)                   Narrative:    Test Reason : R07.9,    Vent. Rate : 074 BPM     Atrial Rate : 074 BPM     P-R Int : 156 ms          QRS Dur : 096 ms      QT Int : 374 ms       P-R-T Axes : 059 -16 002 degrees     QTc Int : 415 ms    Normal sinus rhythm  Nonspecific T wave abnormality  Abnormal ECG  No previous ECGs available    Referred By:             Confirmed By:                                   Imaging Results              X-Ray Chest PA And Lateral (Final result)  Result time  05/01/24 10:11:13      Final result by Ria Farris MD (05/01/24 10:11:13)                   Impression:      No acute cardiopulmonary abnormality.      Electronically signed by: Ria Farris  Date:    05/01/2024  Time:    10:11               Narrative:    EXAMINATION:  XR CHEST PA AND LATERAL    CLINICAL HISTORY:  chest pain;    TECHNIQUE:  Two views of the chest    COMPARISON:  12/14/2021    FINDINGS:  LINES AND TUBES: EKG/telemetry leads overlie the chest.    MEDIASTINUM AND TAY: The cardiac silhouette is normal. There are calcified right paratracheal lymph nodes.    LUNGS: No lobar consolidation. No edema.    PLEURA:No pleural effusion. No pneumothorax.    BONES: No acute osseous abnormality.                                       Medications - No data to display  Medical Decision Making  Differential: NSTEMI, costochondritis, pneumonia, GERD, among others    ED management: HDS and afebrile. Lungs CTA bilaterally. No chest tenderness on exam. RRR, no murmurs. EKG without acute ischemic changes. Troponin negative x 2. CXR without consolidation or effusion. Pt has not had recurrence of chest pain since episode at 7:30 AM. HEART score 3. Stable for discharge home. Instructed to follow up with PCP in 3 days. Strict ED return precautions given for any new or worsening symptoms. He verbalized understanding. All questions answered.     Amount and/or Complexity of Data Reviewed  Labs:  Decision-making details documented in ED Course.  Radiology:  Decision-making details documented in ED Course.  ECG/medicine tests:  Decision-making details documented in ED Course.      Additional MDM:   Heart Score:    History:          Moderately suspicious.  ECG:             Normal  Age:               45-65 years  Risk factors: 1-2 risk factors  Troponin:       Less than or equal to normal limit  Heart Score = 3                ED Course as of 05/01/24 1242   Wed May 01, 2024   1027 Troponin I: <0.010 [KD]   1028 X-Ray  Chest PA And Lateral  No acute cardiopulmonary abnormality. [KD]   1028 EKG 12-lead  NSR, no acute ischemic changes.  [KD]   1240 Troponin I: <0.010 [KD]      ED Course User Index  [KD] Rosario Cole PA-C                           Clinical Impression:  Final diagnoses:  [R07.9] Chest pain (Primary)          ED Disposition Condition    Discharge Stable          ED Prescriptions    None       Follow-up Information       Follow up With Specialties Details Why Contact Info [facility] Emergency Medicine  If symptoms worsen [address] [facility] [id number] [phone]    Eduin Vargas MD Internal Medicine In 3 days Hospital follow up [address] [facility] [address]  [phone]               Rosario Cole PA-C  05/01/24 1247

## 2024-05-01 NOTE — FIRST PROVIDER EVALUATION
Medical screening examination initiated.  I have conducted a focused provider triage encounter, findings are as follows:    Brief history of present illness:  Patient with pmhx of HTN presents today c/o central chest pain that started a little over 1 hour ago.     There were no vitals filed for this visit.    Pertinent physical exam:  Vitals stable. In NAD.     Brief workup plan:  EKG, labs, CXR     Preliminary workup initiated; this workup will be continued and followed by the physician or advanced practice provider that is assigned to the patient when roomed.

## 2024-05-15 ENCOUNTER — HOSPITAL ENCOUNTER (OUTPATIENT)
Dept: RADIOLOGY | Facility: HOSPITAL | Age: 64
Discharge: HOME | End: 2024-05-15

## 2024-05-15 DIAGNOSIS — R10.9 FLANK PAIN: ICD-10-CM

## 2024-05-15 PROCEDURE — 76775 US EXAM ABDO BACK WALL LIM: CPT | Mod: TC

## 2024-06-26 ENCOUNTER — HOSPITAL ENCOUNTER (OUTPATIENT)
Dept: CARDIOLOGY | Facility: HOSPITAL | Age: 64
Discharge: HOME | End: 2024-06-26

## 2024-06-26 VITALS
WEIGHT: 191 LBS | BODY MASS INDEX: 30.7 KG/M2 | SYSTOLIC BLOOD PRESSURE: 164 MMHG | DIASTOLIC BLOOD PRESSURE: 84 MMHG | HEIGHT: 66 IN

## 2024-06-26 DIAGNOSIS — R09.89 JVD (JUGULAR VENOUS DISTENSION): ICD-10-CM

## 2024-06-26 PROCEDURE — 93306 TTE W/DOPPLER COMPLETE: CPT

## 2024-06-27 LAB
APICAL FOUR CHAMBER EJECTION FRACTION: 57 %
APICAL TWO CHAMBER EJECTION FRACTION: 55 %
AV INDEX (PROSTH): 0.68
AV MEAN GRADIENT: 4 MMHG
AV PEAK GRADIENT: 9 MMHG
AV VALVE AREA BY VELOCITY RATIO: 2.68 CM²
AV VALVE AREA: 2.39 CM²
AV VELOCITY RATIO: 0.77
BSA FOR ECHO PROCEDURE: 2.01 M2
CV ECHO LV RWT: 0.39 CM
DOP CALC AO PEAK VEL: 1.51 M/S
DOP CALC AO VTI: 32.9 CM
DOP CALC LVOT AREA: 3.5 CM2
DOP CALC LVOT DIAMETER: 2.11 CM
DOP CALC LVOT PEAK VEL: 1.16 M/S
DOP CALC LVOT STROKE VOLUME: 78.64 CM3
DOP CALC MV VTI: 34.4 CM
DOP CALCLVOT PEAK VEL VTI: 22.5 CM
E WAVE DECELERATION TIME: 303.21 MSEC
E/A RATIO: 1.66
E/E' RATIO: 10.11 M/S
ECHO LV POSTERIOR WALL: 0.91 CM (ref 0.6–1.1)
FRACTIONAL SHORTENING: 28 % (ref 28–44)
HR MV ECHO: 58 BPM
INTERVENTRICULAR SEPTUM: 0.87 CM (ref 0.6–1.1)
LEFT ATRIUM SIZE: 3.82 CM
LEFT INTERNAL DIMENSION IN SYSTOLE: 3.36 CM (ref 2.1–4)
LEFT VENTRICLE DIASTOLIC VOLUME INDEX: 51.48 ML/M2
LEFT VENTRICLE DIASTOLIC VOLUME: 100.9 ML
LEFT VENTRICLE END DIASTOLIC VOLUME APICAL 2 CHAMBER: 100.17 ML
LEFT VENTRICLE END DIASTOLIC VOLUME APICAL 4 CHAMBER: 116.61 ML
LEFT VENTRICLE MASS INDEX: 71 G/M2
LEFT VENTRICLE SYSTOLIC VOLUME INDEX: 23.5 ML/M2
LEFT VENTRICLE SYSTOLIC VOLUME: 46.09 ML
LEFT VENTRICULAR INTERNAL DIMENSION IN DIASTOLE: 4.67 CM (ref 3.5–6)
LEFT VENTRICULAR MASS: 139.12 G
LV LATERAL E/E' RATIO: 9.6 M/S
LV SEPTAL E/E' RATIO: 10.67 M/S
LVED V (TEICH): 100.9 ML
LVES V (TEICH): 46.09 ML
LVOT MG: 2.45 MMHG
LVOT MV: 0.71 CM/S
MV MEAN GRADIENT: 1 MMHG
MV PEAK A VEL: 0.58 M/S
MV PEAK E VEL: 0.96 M/S
MV PEAK GRADIENT: 4 MMHG
MV STENOSIS PRESSURE HALF TIME: 87.93 MS
MV VALVE AREA BY CONTINUITY EQUATION: 2.29 CM2
MV VALVE AREA P 1/2 METHOD: 2.5 CM2
OHS CV RV/LV RATIO: 0.69 CM
OHS LV EJECTION FRACTION SIMPSONS BIPLANE MOD: 55 %
PISA TR MAX VEL: 1.43 M/S
RA PRESSURE ESTIMATED: 3 MMHG
RIGHT VENTRICULAR END-DIASTOLIC DIMENSION: 3.21 CM
RV TB RVSP: 4 MMHG
TDI LATERAL: 0.1 M/S
TDI SEPTAL: 0.09 M/S
TDI: 0.1 M/S
TR MAX PG: 8 MMHG
TV REST PULMONARY ARTERY PRESSURE: 11 MMHG
Z-SCORE OF LEFT VENTRICULAR DIMENSION IN END DIASTOLE: -1.83
Z-SCORE OF LEFT VENTRICULAR DIMENSION IN END SYSTOLE: -0.21

## 2024-08-08 ENCOUNTER — OFFICE VISIT (OUTPATIENT)
Dept: INTERNAL MEDICINE | Facility: CLINIC | Age: 64
End: 2024-08-08

## 2024-08-08 VITALS
HEART RATE: 63 BPM | DIASTOLIC BLOOD PRESSURE: 78 MMHG | BODY MASS INDEX: 31.98 KG/M2 | WEIGHT: 199 LBS | SYSTOLIC BLOOD PRESSURE: 137 MMHG | OXYGEN SATURATION: 95 % | TEMPERATURE: 98 F | HEIGHT: 66 IN | RESPIRATION RATE: 14 BRPM

## 2024-08-08 DIAGNOSIS — R56.9 SEIZURE: ICD-10-CM

## 2024-08-08 DIAGNOSIS — R73.03 PREDIABETES: ICD-10-CM

## 2024-08-08 DIAGNOSIS — Z00.00 ROUTINE ADULT HEALTH MAINTENANCE: ICD-10-CM

## 2024-08-08 DIAGNOSIS — N40.0 BENIGN PROSTATIC HYPERPLASIA, UNSPECIFIED WHETHER LOWER URINARY TRACT SYMPTOMS PRESENT: ICD-10-CM

## 2024-08-08 DIAGNOSIS — I10 HYPERTENSION, UNSPECIFIED TYPE: Primary | ICD-10-CM

## 2024-08-08 DIAGNOSIS — R91.1 SOLITARY PULMONARY NODULE: ICD-10-CM

## 2024-08-08 LAB — HBA1C MFR BLD: 5.8 %

## 2024-08-08 PROCEDURE — 99215 OFFICE O/P EST HI 40 MIN: CPT | Mod: PBBFAC

## 2024-08-08 PROCEDURE — 83036 HEMOGLOBIN GLYCOSYLATED A1C: CPT | Mod: PBBFAC

## 2024-08-08 RX ORDER — NEBULIZER AND COMPRESSOR
1 EACH MISCELLANEOUS DAILY
Qty: 1 EACH | Refills: 0 | COMMUNITY
Start: 2024-08-08

## 2025-02-10 DIAGNOSIS — N40.0 BENIGN PROSTATIC HYPERPLASIA, UNSPECIFIED WHETHER LOWER URINARY TRACT SYMPTOMS PRESENT: ICD-10-CM

## 2025-02-11 RX ORDER — FINASTERIDE 5 MG/1
5 TABLET, FILM COATED ORAL DAILY
Qty: 90 TABLET | Refills: 3 | Status: SHIPPED | OUTPATIENT
Start: 2025-02-11

## 2025-02-18 NOTE — PROGRESS NOTES
INTERNAL MEDICINE RESIDENT CLINIC  CLINIC NOTE    Patient Name: Severiano Vences Jr.  YOB: 1960  Chief Complaint: Abdominal Pain (Flank pain gets better when bowels move  takes stool softener ) and Hypertension  PRESENTING HISTORY       History of Present Illness:  Patient is a 64 y.o. male with a pmhx of solitary pulmonary nodule,  hypertension, BPH, and seizure disorder who presents to clinic for routine follow up, last seen in clinic on 08/08/2024.    Patient reports very occasional denying pain on right lower abdomen.  Reports that pain is resolved with bowel movements.  The pain is not significant and he only notes it every couple of weeks.  He is compliant with all of his medications.  Overall, doing well.        Constitutional: no fever/chills  EENT: no sore throat, ear pain, sinus pain/congestion, nasal congestion/drainage  Respiratory: no cough, no wheezing, no shortness of breath  Cardiovascular: no chest pain, no palpitations, no edema  Gastrointestinal: no nausea, vomiting, or diarrhea. No abdominal pain  Genitourinary: no dysuria, no urinary frequency or urgency, no hematuria  Integumentary: no skin rash or abnormal lesion  Musculoskeletal:  Normal range of motion  Neurologic: no headache, no dizziness, no weakness or numbness    PAST HISTORY:     Past Medical History:   Diagnosis Date    BPH (benign prostatic hyperplasia)     Hypertension     Seizures        History reviewed. No pertinent surgical history.    Family History   Problem Relation Name Age of Onset    Colon cancer Father         Social History     Socioeconomic History    Marital status:    Occupational History    Occupation:    Tobacco Use    Smoking status: Former     Current packs/day: 0.00     Types: Cigarettes     Quit date: 12/26/2009     Years since quitting: 15.1    Smokeless tobacco: Never    Tobacco comments:     Quit 27 years ago   Substance and Sexual Activity    Alcohol use: Yes     Alcohol/week:  3.0 standard drinks of alcohol     Types: 3 Glasses of wine per week     Comment: OCCASIONAL WINE DRINKER    Drug use: Yes     Frequency: 7.0 times per week     Types: Marijuana    Sexual activity: Not Currently     Social Drivers of Health     Financial Resource Strain: Low Risk  (11/30/2023)    Overall Financial Resource Strain (CARDIA)     Difficulty of Paying Living Expenses: Not hard at all   Food Insecurity: No Food Insecurity (11/30/2023)    Hunger Vital Sign     Worried About Running Out of Food in the Last Year: Never true     Ran Out of Food in the Last Year: Never true   Transportation Needs: No Transportation Needs (11/30/2023)    PRAPARE - Transportation     Lack of Transportation (Medical): No     Lack of Transportation (Non-Medical): No   Physical Activity: Inactive (11/30/2023)    Exercise Vital Sign     Days of Exercise per Week: 0 days     Minutes of Exercise per Session: 0 min   Stress: No Stress Concern Present (11/30/2023)    [employer] - Occupational Stress Questionnaire     Feeling of Stress : Only a little   Housing Stability: Low Risk  (11/30/2023)    Housing Stability Vital Sign     Unable to Pay for Housing in the Last Year: No     Number of Places Lived in the Last Year: 1     Unstable Housing in the Last Year: No       MEDICATIONS & ALLERGIES:     Current Outpatient Medications on File Prior to Visit   Medication Sig    BLOOD PRESSURE CUFF Misc 1 each by Misc.(Non-Drug; Combo Route) route once daily.    finasteride (PROSCAR) 5 mg tablet Take 1 tablet (5 mg total) by mouth once daily.    losartan (COZAAR) 100 MG tablet Take 1 tablet (100 mg total) by mouth once daily.    phenytoin (DILANTIN) 100 MG ER capsule 2 CAPS IN AM, 3 CAPS AT BEDTIME    rosuvastatin (CRESTOR) 5 MG tablet Take 5 mg by mouth every evening. States taking prn     No current facility-administered medications on file prior to visit.       Review of patient's allergies indicates:  No Known  "Allergies    OBJECTIVE:   Vital Signs:  Vitals:    02/19/25 0849   BP: 136/76   Pulse: 75   Resp: 16   Temp: 97.4 °F (36.3 °C)   TempSrc: Oral   SpO2: 99%   Weight: 90.4 kg (199 lb 6.4 oz)   Height: 5' 6" (1.676 m)       No results found for this or any previous visit (from the past 24 hours).          General: well-developed, well-nourished, no acute distress  Respiratory: clear to auscultation bilaterally without wheezes, rales, rhonchi  Cardiovascular: regular rate and rhythm without murmurs, gallops or rubs, normal S1 S2.  No JVD.  No lower extremity edema.  Gastrointestinal: soft, non-tender, non-distended with normal bowel sounds in all four quadrants. No masses palpated  Musculoskeletal: full range of motion of all extremities without limitation or discomfort.  Integumentary: no rashes or skin lesions present, no erythema  Neurologic: AAO x 3, no dysarthria.  Psychiatric: cooperative with exam, good eye contact, no hallucinations.      Laboratory  Lab Results   Component Value Date    WBC 5.80 05/01/2024    HGB 14.5 05/01/2024    HCT 42.0 05/01/2024    MCV 87.0 05/01/2024     (H) 05/01/2024     @[id number](GLU,NA,K,Cl,CO2,BUN,Creatinine,Calcium,MG)@  Lab Results   Component Value Date    INR 1.0 05/01/2024     Lab Results   Component Value Date    HGBA1C 5.7 08/25/2021     No results for input(s): "POCTGLUCOSE" in the last 72 hours.    Diagnostic Results:    ASSESSMENT & PLAN:     Hypertension  -BP today: 136/76  -Continue losartan 100 mg daily  -previously prescribed blood pressure cuff, instructed patient to log blood pressure readings at home.    Prediabetes  Hyperlipidemia  -A1c today 5.7.  -last .    -patient is prescribed rosuvastatin although states that he has not been taking it.    -patient states that he will start taking rosuvastatin regularly.    -repeat lipid panel ordered.    Seizure disorder  -Last seizure >3 years ago, patient correlates this with medication noncompliance at the " time.  -Continue current regimen of phenytoin (200 mg every morning, 300 mg every afternoon, states he has been on this regiment for approximately 10 years)    BPH  -States he was previously diagnosed with BPH in the past, takes finasteride 5 mg daily, denies dysuria, denies frequency or dribbling  -PSA .36 on 22  -repeat PSA 0.35.    Simple renal cyst  -renal ultrasound: Cyst of the midpole of the right kidney.    Solitary pulmonary nodule  -LDCT 22- 3 mm nodule present in right lower lobe.  -LDCT 2023:  Stable 4 mm nodule on right major fissure.  Benign appearance.  -LDCT 2024: Benign Appearance or Behavior - continue annual screening with LDCT in 12 months.         Health Maintenance   TDAP:  2020  Influenza vaccine: Declined  Shingrix vaccine: Declined  Colon CA screenin2022: 4mm sessile polyp in descedning colon, 3mm sessily polyp in sigmoid colon.  Pathology consistent with hyperplastic polyp and adenomatous polyp respectively.  Recommendations to repeat colonoscopy in 5 years.   Lung CA screening: LDCT 2024: Benign Appearance or Behavior - continue annual screening with LDCT in 12 months.   Hepatitis C:  2022  HIV: declined  COVID-19: declined        RTC in 6 months.         Eduin Vargas MD  Internal Medicine PGY-2

## 2025-02-19 ENCOUNTER — OFFICE VISIT (OUTPATIENT)
Dept: INTERNAL MEDICINE | Facility: CLINIC | Age: 65
End: 2025-02-19
Payer: MEDICARE

## 2025-02-19 ENCOUNTER — LAB VISIT (OUTPATIENT)
Dept: LAB | Facility: HOSPITAL | Age: 65
End: 2025-02-19
Payer: MEDICARE

## 2025-02-19 VITALS
HEART RATE: 75 BPM | RESPIRATION RATE: 16 BRPM | WEIGHT: 199.38 LBS | SYSTOLIC BLOOD PRESSURE: 136 MMHG | HEIGHT: 66 IN | DIASTOLIC BLOOD PRESSURE: 76 MMHG | OXYGEN SATURATION: 99 % | TEMPERATURE: 97 F | BODY MASS INDEX: 32.04 KG/M2

## 2025-02-19 DIAGNOSIS — I10 HYPERTENSION, UNSPECIFIED TYPE: Primary | ICD-10-CM

## 2025-02-19 DIAGNOSIS — R73.03 PREDIABETES: ICD-10-CM

## 2025-02-19 DIAGNOSIS — Z00.00 ROUTINE ADULT HEALTH MAINTENANCE: ICD-10-CM

## 2025-02-19 DIAGNOSIS — Z87.891 HISTORY OF TOBACCO USE: ICD-10-CM

## 2025-02-19 DIAGNOSIS — I10 HYPERTENSION, UNSPECIFIED TYPE: ICD-10-CM

## 2025-02-19 LAB
ALBUMIN SERPL-MCNC: 4.2 G/DL (ref 3.4–4.8)
ALBUMIN/GLOB SERPL: 1.3 RATIO (ref 1.1–2)
ALP SERPL-CCNC: 104 UNIT/L (ref 40–150)
ALT SERPL-CCNC: 32 UNIT/L (ref 0–55)
ANION GAP SERPL CALC-SCNC: 8 MEQ/L
AST SERPL-CCNC: 20 UNIT/L (ref 5–34)
BASOPHILS # BLD AUTO: 0.04 X10(3)/MCL
BASOPHILS NFR BLD AUTO: 0.6 %
BILIRUB SERPL-MCNC: 0.3 MG/DL
BUN SERPL-MCNC: 13.9 MG/DL (ref 8.4–25.7)
CALCIUM SERPL-MCNC: 9.2 MG/DL (ref 8.8–10)
CHLORIDE SERPL-SCNC: 103 MMOL/L (ref 98–107)
CHOLEST SERPL-MCNC: 216 MG/DL
CHOLEST/HDLC SERPL: 4 {RATIO} (ref 0–5)
CO2 SERPL-SCNC: 26 MMOL/L (ref 23–31)
CREAT SERPL-MCNC: 0.71 MG/DL (ref 0.72–1.25)
CREAT/UREA NIT SERPL: 20
EOSINOPHIL # BLD AUTO: 0.15 X10(3)/MCL (ref 0–0.9)
EOSINOPHIL NFR BLD AUTO: 2.4 %
ERYTHROCYTE [DISTWIDTH] IN BLOOD BY AUTOMATED COUNT: 14.4 % (ref 11.5–17)
EST. AVERAGE GLUCOSE BLD GHB EST-MCNC: 116.9 MG/DL
GFR SERPLBLD CREATININE-BSD FMLA CKD-EPI: >60 ML/MIN/1.73/M2
GLOBULIN SER-MCNC: 3.3 GM/DL (ref 2.4–3.5)
GLUCOSE SERPL-MCNC: 93 MG/DL (ref 82–115)
HBA1C MFR BLD: 5.7 %
HCT VFR BLD AUTO: 43 % (ref 42–52)
HDLC SERPL-MCNC: 55 MG/DL (ref 35–60)
HGB BLD-MCNC: 14.9 G/DL (ref 14–18)
HIV 1+2 AB+HIV1 P24 AG SERPL QL IA: NONREACTIVE
IMM GRANULOCYTES # BLD AUTO: 0.01 X10(3)/MCL (ref 0–0.04)
IMM GRANULOCYTES NFR BLD AUTO: 0.2 %
LDLC SERPL CALC-MCNC: 136 MG/DL (ref 50–140)
LYMPHOCYTES # BLD AUTO: 2.36 X10(3)/MCL (ref 0.6–4.6)
LYMPHOCYTES NFR BLD AUTO: 37 %
MCH RBC QN AUTO: 29.7 PG (ref 27–31)
MCHC RBC AUTO-ENTMCNC: 34.7 G/DL (ref 33–36)
MCV RBC AUTO: 85.7 FL (ref 80–94)
MONOCYTES # BLD AUTO: 0.63 X10(3)/MCL (ref 0.1–1.3)
MONOCYTES NFR BLD AUTO: 9.9 %
NEUTROPHILS # BLD AUTO: 3.19 X10(3)/MCL (ref 2.1–9.2)
NEUTROPHILS NFR BLD AUTO: 49.9 %
NRBC BLD AUTO-RTO: 0 %
PLATELET # BLD AUTO: 344 X10(3)/MCL (ref 130–400)
PMV BLD AUTO: 8.2 FL (ref 7.4–10.4)
POTASSIUM SERPL-SCNC: 3.8 MMOL/L (ref 3.5–5.1)
PROT SERPL-MCNC: 7.5 GM/DL (ref 5.8–7.6)
RBC # BLD AUTO: 5.02 X10(6)/MCL (ref 4.7–6.1)
SODIUM SERPL-SCNC: 137 MMOL/L (ref 136–145)
TRIGL SERPL-MCNC: 126 MG/DL (ref 34–140)
VLDLC SERPL CALC-MCNC: 25 MG/DL
WBC # BLD AUTO: 6.38 X10(3)/MCL (ref 4.5–11.5)

## 2025-02-19 PROCEDURE — 83036 HEMOGLOBIN GLYCOSYLATED A1C: CPT

## 2025-02-19 PROCEDURE — 87389 HIV-1 AG W/HIV-1&-2 AB AG IA: CPT

## 2025-02-19 PROCEDURE — 80053 COMPREHEN METABOLIC PANEL: CPT

## 2025-02-19 PROCEDURE — 80061 LIPID PANEL: CPT

## 2025-02-19 PROCEDURE — 36415 COLL VENOUS BLD VENIPUNCTURE: CPT

## 2025-02-19 PROCEDURE — 99214 OFFICE O/P EST MOD 30 MIN: CPT | Mod: PBBFAC

## 2025-02-19 PROCEDURE — 85025 COMPLETE CBC W/AUTO DIFF WBC: CPT

## 2025-02-19 NOTE — PROGRESS NOTES
I have reveiwed and agree with the resident's findings, including all diagnostic interpretations and plans as written.    No need for further workup on Mr. Vences's intermittent right abdominal discomfort. It happens on occasion and is relieved with BM's. He is up to date on routine cancer screenings. US showed right renal cyst, 2cm - I did not see septations on my review of the image and none were mentioned on the report. Will monitor clinically.

## 2025-03-24 DIAGNOSIS — G40.909 SEIZURE DISORDER: ICD-10-CM

## 2025-03-24 RX ORDER — PHENYTOIN SODIUM 100 MG/1
CAPSULE, EXTENDED RELEASE ORAL
Qty: 150 CAPSULE | Refills: 11 | Status: SHIPPED | OUTPATIENT
Start: 2025-03-24

## 2025-03-31 DIAGNOSIS — I10 HYPERTENSION, UNSPECIFIED TYPE: ICD-10-CM

## 2025-04-01 RX ORDER — LOSARTAN POTASSIUM 100 MG/1
100 TABLET ORAL DAILY
Qty: 90 TABLET | Refills: 3 | Status: SHIPPED | OUTPATIENT
Start: 2025-04-01

## 2025-08-03 NOTE — PROGRESS NOTES
[facility]  CLINIC NOTE    Patient Name: Severiano Vences Jr.  YOB: 1960  Chief Complaint: Hypertension (Denies new complaint , still with vague complaint of Stomach pain relieved by BM)  PRESENTING HISTORY       History of Present Illness:  Patient is a 64 y.o. male with a pmhx of obesity, solitary pulmonary nodule,  hypertension, BPH, and seizure disorder who presents to clinic for routine follow up, last seen in clinic on 02/19/2025.    Patient reports he is doing well.  He endorses some mild abdominal discomfort with proximally once per month that has relieved with bowel movements.  States that he typically has 1 bowel movement daily, occasionally he will not have a bowel movement for 3 days.  In his during this interval that he will experience the abdominal discomfort.  He is up-to-date on his colonoscopy screening.  Patient was previously prescribed a statin for high cholesterol although he has not been taking it for some time.        Constitutional: no fever/chills  EENT: no sore throat, ear pain, sinus pain/congestion, nasal congestion/drainage  Respiratory: no cough, no wheezing, no shortness of breath  Cardiovascular: no chest pain, no palpitations, no edema  Gastrointestinal: no nausea, vomiting, or diarrhea. No abdominal pain  Genitourinary: no dysuria, no urinary frequency or urgency, no hematuria  Integumentary: no skin rash or abnormal lesion  Musculoskeletal:  Normal range of motion  Neurologic: no headache, no dizziness, no weakness or numbness    PAST HISTORY:     Past Medical History:   Diagnosis Date    BPH (benign prostatic hyperplasia)     Hypertension     Seizures        History reviewed. No pertinent surgical history.    Family History   Problem Relation Name Age of Onset    Colon cancer Father         Social History     Socioeconomic History    Marital status:    Occupational History    Occupation:    Tobacco Use    Smoking status: Former      Current packs/day: 0.00     Types: Cigarettes     Quit date: 12/26/2009     Years since quitting: 15.6    Smokeless tobacco: Never    Tobacco comments:     Quit 27 years ago   Substance and Sexual Activity    Alcohol use: Yes     Alcohol/week: 3.0 standard drinks of alcohol     Types: 3 Glasses of wine per week     Comment: OCCASIONAL WINE DRINKER    Drug use: Yes     Frequency: 7.0 times per week     Types: Marijuana    Sexual activity: Not Currently     Social Drivers of Health     Financial Resource Strain: Low Risk  (11/30/2023)    Overall Financial Resource Strain (CARDIA)     Difficulty of Paying Living Expenses: Not hard at all   Food Insecurity: No Food Insecurity (11/30/2023)    Hunger Vital Sign     Worried About Running Out of Food in the Last Year: Never true     Ran Out of Food in the Last Year: Never true   Transportation Needs: No Transportation Needs (11/30/2023)    PRAPARE - Transportation     Lack of Transportation (Medical): No     Lack of Transportation (Non-Medical): No   Physical Activity: Inactive (11/30/2023)    Exercise Vital Sign     Days of Exercise per Week: 0 days     Minutes of Exercise per Session: 0 min   Stress: No Stress Concern Present (11/30/2023)    [employer] [place] [employer] - Occupational Stress Questionnaire     Feeling of Stress : Only a little   Housing Stability: Low Risk  (11/30/2023)    Housing Stability Vital Sign     Unable to Pay for Housing in the Last Year: No     Number of Places Lived in the Last Year: 1     Unstable Housing in the Last Year: No       MEDICATIONS & ALLERGIES:     Current Outpatient Medications on File Prior to Visit   Medication Sig    finasteride (PROSCAR) 5 mg tablet Take 1 tablet (5 mg total) by mouth once daily.    losartan (COZAAR) 100 MG tablet Take 1 tablet (100 mg total) by mouth once daily.    phenytoin (DILANTIN) 100 MG ER capsule 2 CAPS IN AM, 3 CAPS AT BEDTIME    rosuvastatin (CRESTOR) 5 MG tablet Take 5 mg by mouth  every evening. States taking prn    BLOOD PRESSURE CUFF Misc 1 each by Misc.(Non-Drug; Combo Route) route once daily. (Patient not taking: Reported on 8/4/2025)     No current facility-administered medications on file prior to visit.       Review of patient's allergies indicates:  No Known Allergies    OBJECTIVE:   Vital Signs:  Vitals:    08/04/25 0906   BP: 122/70   Pulse: 62   Temp: 98.1 °F (36.7 °C)   Weight: 90 kg (198 lb 6.4 oz)         No results found for this or any previous visit (from the past 24 hours).          General: well-developed, well-nourished, no acute distress  Respiratory: clear to auscultation bilaterally without wheezes, rales, rhonchi  Cardiovascular: regular rate and rhythm without murmurs, gallops or rubs, normal S1 S2.  No JVD.  No lower extremity edema.  Gastrointestinal: soft, non-tender, non-distended with normal bowel sounds in all four quadrants. No masses palpated  Musculoskeletal: full range of motion of all extremities without limitation or discomfort.  Integumentary: no rashes or skin lesions present, no erythema  Neurologic: AAO x 3, no dysarthria.  Psychiatric: cooperative with exam, good eye contact, no hallucinations.        ASSESSMENT & PLAN:     Hypertension  -BP today: 122/70  -Continue losartan 100 mg daily    Prediabetes  Hyperlipidemia  -A1c today 5.7.  -last   -previously was prescribed rosuvastatin although patient has not been taking it.  Explained importance of taking statin therapy to reduce risk of cardiovascular events given ASCVD risk score.  -patient agreeable to initiate statin therapy, prescribe atorvastatin 40 mg daily.  The 10-year ASCVD risk score (Ira DK, et al., 2019) is: 14.7%    Values used to calculate the score:      Age: 64 years      Sex: Male      Is Non- : Yes      Diabetic: No      Tobacco smoker: No      Systolic Blood Pressure: 122 mmHg      Is BP treated: Yes      HDL Cholesterol: 55 mg/dL      Total  Cholesterol: 216 mg/dL    Constipation  -reports abdominal discomfort that is relieved with bowel movement.    -he is up-to-date with colonoscopy, next colonoscopy due in .    -prescribed Colace PRN.    Seizure disorder  -Last seizure >4 years ago, patient correlates this with medication noncompliance at the time.  -Continue current regimen of phenytoin (200 mg every morning, 300 mg every afternoon, states he has been on this regiment for approximately 10 years)    BPH  -States he was previously diagnosed with BPH in the past, takes finasteride 5 mg daily, denies dysuria, denies frequency or dribbling  -PSA .35 on 2024.  -repeat PSA 0.35.    Simple renal cyst  -renal ultrasound: Cyst of the midpole of the right kidney.    Solitary pulmonary nodule  -LDCT 22- 3 mm nodule present in right lower lobe.  -LDCT 2023:  Stable 4 mm nodule on right major fissure.  Benign appearance.  -LDCT 2024: Benign Appearance or Behavior - continue annual screening with LDCT in 12 months.         Health Maintenance   TDAP:  2020  Influenza vaccine: Declined  Shingrix vaccine: Declined  Colon CA screenin2022: 4mm sessile polyp in descedning colon, 3mm sessily polyp in sigmoid colon.  Pathology consistent with hyperplastic polyp and adenomatous polyp respectively.  Recommendations to repeat colonoscopy in 5 years.   Lung CA screening: LDCT 2024: Benign Appearance or Behavior - continue annual screening with LDCT in 12 months.   Hepatitis C:  2022  HIV: declined  COVID-19: declined      RTC in 6 months.         Eduin Vargas MD  Internal Medicine PGY-3

## 2025-08-04 ENCOUNTER — OFFICE VISIT (OUTPATIENT)
Dept: INTERNAL MEDICINE | Facility: CLINIC | Age: 65
End: 2025-08-04
Payer: MEDICARE

## 2025-08-04 VITALS
DIASTOLIC BLOOD PRESSURE: 70 MMHG | BODY MASS INDEX: 32.02 KG/M2 | WEIGHT: 198.38 LBS | SYSTOLIC BLOOD PRESSURE: 122 MMHG | TEMPERATURE: 98 F | HEART RATE: 62 BPM

## 2025-08-04 DIAGNOSIS — E78.5 HYPERLIPIDEMIA, UNSPECIFIED HYPERLIPIDEMIA TYPE: ICD-10-CM

## 2025-08-04 DIAGNOSIS — G40.909 SEIZURE DISORDER: ICD-10-CM

## 2025-08-04 DIAGNOSIS — I10 HYPERTENSION, UNSPECIFIED TYPE: Primary | ICD-10-CM

## 2025-08-04 DIAGNOSIS — R73.03 PREDIABETES: ICD-10-CM

## 2025-08-04 DIAGNOSIS — K59.00 CONSTIPATION, UNSPECIFIED CONSTIPATION TYPE: ICD-10-CM

## 2025-08-04 PROCEDURE — 99213 OFFICE O/P EST LOW 20 MIN: CPT | Mod: PBBFAC

## 2025-08-04 RX ORDER — ATORVASTATIN CALCIUM 40 MG/1
40 TABLET, FILM COATED ORAL DAILY
Qty: 90 TABLET | Refills: 3 | Status: SHIPPED | OUTPATIENT
Start: 2025-08-04 | End: 2026-08-04

## 2025-08-04 RX ORDER — DOCUSATE SODIUM 100 MG/1
100 CAPSULE, LIQUID FILLED ORAL 2 TIMES DAILY PRN
Qty: 60 CAPSULE | Refills: 0 | Status: SHIPPED | OUTPATIENT
Start: 2025-08-04

## 2025-08-04 RX ORDER — LOSARTAN POTASSIUM 100 MG/1
100 TABLET ORAL DAILY
Qty: 90 TABLET | Refills: 3 | Status: SHIPPED | OUTPATIENT
Start: 2025-08-04

## (undated) DEVICE — TRAP ETRAP POLYP 50 TRAY

## (undated) DEVICE — SNARE EXACTO COLD

## (undated) DEVICE — KIT VALVE DEFENDO ENDOSCOPY

## (undated) DEVICE — MANIFOLD 4 PORT